# Patient Record
Sex: FEMALE | Race: WHITE | Employment: OTHER | URBAN - METROPOLITAN AREA
[De-identification: names, ages, dates, MRNs, and addresses within clinical notes are randomized per-mention and may not be internally consistent; named-entity substitution may affect disease eponyms.]

---

## 2017-05-09 ENCOUNTER — OFFICE VISIT (OUTPATIENT)
Dept: ORTHOPEDIC SURGERY | Age: 72
End: 2017-05-09

## 2017-05-09 VITALS
SYSTOLIC BLOOD PRESSURE: 111 MMHG | DIASTOLIC BLOOD PRESSURE: 66 MMHG | HEIGHT: 62 IN | WEIGHT: 91 LBS | BODY MASS INDEX: 16.75 KG/M2 | HEART RATE: 85 BPM

## 2017-05-09 DIAGNOSIS — M25.512 LEFT SHOULDER PAIN, UNSPECIFIED CHRONICITY: ICD-10-CM

## 2017-05-09 DIAGNOSIS — M75.82 ROTATOR CUFF TENDONITIS, LEFT: Primary | ICD-10-CM

## 2017-05-09 DIAGNOSIS — M75.22 BICEPS TENDONITIS ON LEFT: ICD-10-CM

## 2017-05-09 PROCEDURE — 99203 OFFICE O/P NEW LOW 30 MIN: CPT | Performed by: ORTHOPAEDIC SURGERY

## 2017-05-09 PROCEDURE — 73030 X-RAY EXAM OF SHOULDER: CPT | Performed by: ORTHOPAEDIC SURGERY

## 2017-05-09 RX ORDER — ATORVASTATIN CALCIUM 10 MG/1
TABLET, FILM COATED ORAL
COMMUNITY
Start: 2017-04-24 | End: 2022-01-20 | Stop reason: ALTCHOICE

## 2017-05-09 RX ORDER — ALENDRONATE SODIUM 70 MG/1
70 TABLET ORAL
COMMUNITY
End: 2022-01-20 | Stop reason: ALTCHOICE

## 2017-05-10 ENCOUNTER — HOSPITAL ENCOUNTER (OUTPATIENT)
Dept: PHYSICAL THERAPY | Age: 72
Discharge: HOME | End: 2017-05-31
Admitting: ORTHOPAEDIC SURGERY

## 2017-05-17 ENCOUNTER — HOSPITAL ENCOUNTER (OUTPATIENT)
Dept: PHYSICAL THERAPY | Age: 72
Discharge: HOME | End: 2017-05-17
Admitting: ORTHOPAEDIC SURGERY

## 2017-05-22 ENCOUNTER — HOSPITAL ENCOUNTER (OUTPATIENT)
Dept: PHYSICAL THERAPY | Age: 72
Discharge: HOME | End: 2017-05-22
Admitting: ORTHOPAEDIC SURGERY

## 2017-06-02 ENCOUNTER — HOSPITAL ENCOUNTER (OUTPATIENT)
Dept: PHYSICAL THERAPY | Age: 72
Discharge: HOME | End: 2017-06-02
Admitting: ORTHOPAEDIC SURGERY

## 2017-06-07 ENCOUNTER — HOSPITAL ENCOUNTER (OUTPATIENT)
Dept: PHYSICAL THERAPY | Age: 72
Discharge: HOME | End: 2017-06-07
Admitting: ORTHOPAEDIC SURGERY

## 2021-09-22 ENCOUNTER — OFFICE VISIT (OUTPATIENT)
Dept: ORTHOPEDIC SURGERY | Age: 76
End: 2021-09-22
Payer: COMMERCIAL

## 2021-09-22 VITALS — WEIGHT: 110 LBS | BODY MASS INDEX: 20.24 KG/M2 | HEIGHT: 62 IN

## 2021-09-22 DIAGNOSIS — M75.21 BICEPS TENDINITIS OF RIGHT UPPER EXTREMITY: ICD-10-CM

## 2021-09-22 DIAGNOSIS — M25.511 RIGHT SHOULDER PAIN, UNSPECIFIED CHRONICITY: Primary | ICD-10-CM

## 2021-09-22 DIAGNOSIS — M75.81 TENDINITIS OF RIGHT ROTATOR CUFF: ICD-10-CM

## 2021-09-22 DIAGNOSIS — M54.2 NECK PAIN: ICD-10-CM

## 2021-09-22 PROCEDURE — 99214 OFFICE O/P EST MOD 30 MIN: CPT | Performed by: ORTHOPAEDIC SURGERY

## 2021-09-22 NOTE — PROGRESS NOTES
Date:  2021    Name:  Vero Jeronimo  Address:  [address] [address]    :  1945      Age:   67 y.o.    SSN:  [id number]      Medical Record Number:  [id number]    Reason for Visit:    Chief Complaint    Shoulder Pain (old patient / new problem right shoulder. )      DOS:2021     HPI: Vero Jeronimo is a 67 y.o. female here today for evaluation of her right shoulder pain. Her pain has been going on for 1 year. It is getting worse over time. The pain is worse with ambulation when she feels that pain from her shoulder radiating down to her arm. She is having numbness on the dorsal side of her arm and forearm when the pain is a severe. She cannot sleep on her right shoulder and the pain wakes her up at night. She has been taking Alieve but it is not helping her pain. She complains of weakness in her right arm due to the shoulder pain. Pain Assessment  Location of Pain: Shoulder  Location Modifiers: Right  Severity of Pain: 6  Quality of Pain: Sharp, Dull, Aching  Duration of Pain: Persistent  Frequency of Pain: Constant  Aggravating Factors:  (any movement)  Limiting Behavior: Yes  Relieving Factors: Rest  Result of Injury: No  Work-Related Injury: No  Are there other pain locations you wish to document?: No  ROS: All systems reviewed on patient intake form. Pertinent items are noted in HPI. Past Medical History:   Diagnosis Date    Arthritis     Cataract     Hyperlipidemia         Past Surgical History:   Procedure Laterality Date    CATARACT REMOVAL WITH IMPLANT         History reviewed. No pertinent family history.     Social History     Socioeconomic History    Marital status:      Spouse name: None    Number of children: None    Years of education: None    Highest education level: None   Occupational History    None   Tobacco Use    Smoking status: Never Smoker    Smokeless tobacco: Never Used   Substance and Sexual Activity    Alcohol use: No    Drug use: No    Sexual activity: None   Other Topics Concern    None   Social History Narrative    None     Social Determinants of Health     Financial Resource Strain:     Difficulty of Paying Living Expenses:    Food Insecurity:     Worried About Running Out of Food in the Last Year:     [address] in the Last Year:    Transportation Needs:     Lack of Transportation (Medical):  Lack of Transportation (Non-Medical):    Physical Activity:     Days of Exercise per Week:     Minutes of Exercise per Session:    Stress:     Feeling of Stress :    Social Connections:     Frequency of Communication with Friends and Family:     Frequency of Social Gatherings with Friends and Family:     Attends Buddhist Services:     Active Member of Clubs or Organizations:     Attends Club or Organization Meetings:     Marital Status:    Intimate Partner Violence:     Fear of Current or Ex-Partner:     Emotionally Abused:     Physically Abused:     Sexually Abused:        Current Outpatient Medications   Medication Sig Dispense Refill    alendronate (FOSAMAX) 70 MG tablet Take 70 mg by mouth      atorvastatin (LIPITOR) 10 MG tablet TAKE 1 TABLET (10 MG TOTAL) BY MOUTH DAILY.  atorvastatin (LIPITOR) 10 MG tablet Take 10 mg by mouth daily       No current facility-administered medications for this visit. Allergies   Allergen Reactions    Acetaminophen-Codeine      TYLENOL #3    Demerol     Meperidine Nausea And Vomiting    Penicillins Rash       Vital signs:  Ht 5' 1.5\" (1.562 m)   Wt 110 lb (49.9 kg)   BMI 20.45 kg/m²          Right shoulder exam    Inspection:  Held in a normal posture. Normal contour at the acromioclavicular joint. No swelling, ecchymosis, or erythema about the shoulder. Moderate atrophy appreciated. No scapular winging. Palpation:  No subacromial crepitus. No tenderness of the AC joint. greater tuberosity tenderness.   Tenderness on the insertion of the rotator cuff    Range of Motion: Full passive ROM. Normal scapulothoracic rhythm. Limited active range of motion in abduction and forward flexion and internal rotation. Forward flexion to 90 degrees, abduction to 45 degrees, limited internal rotation    Strength: Weak supraspinatus,  Normal infraspinatus, and subscapularis muscle strength. Stability: No anterior instability. No posterior instability. Special Tests: Impingement findings are negative. Labral findings are negative. Speed sign and Yergason signs are both negative. Crossover sign is negative. Belly press sign is negative. Lift off sign is negative. Other findings: The skin is warm dry and well perfused. 2+ radial pulse. Sensation is intact to light touch over the deltoid. C spine: No pain or numbness with range of motion. Negative Tinel's sign on the carpal tunnel      Left comparison shoulder exam    Inspection:  Held in a normal posture. Normal contour at the acromioclavicular joint. No swelling, ecchymosis, or erythema about the shoulder. No atrophy appreciated. No scapular winging. Palpation:  No subacromial crepitus. No tenderness of the AC joint. No greater tuberosity tenderness. No tenderness in the bicipital groove. Range of Motion: Full passive and active ROM. Normal scapulothoracic rhythm. Strength:  Normal supraspinatus, infraspinatus, and subscapularis muscle strength. Stability: No anterior instability. No posterior instability. Special Tests: Impingement findings are negative. Labral findings are negative. Speed sign and Yergason signs are both negative. Crossover sign is negative. Belly press sign is negative. Lift off sign is negative. Other findings: The skin is warm dry and well perfused. 2+ radial pulse. Sensation is intact to light touch over the deltoid.       Diagnostics:  Radiology:       AP lateral axillary views of the right shoulder AP lateral of the cervical spine were obtained in the office today and independently interpreted    Impression:  No acute fracture or dislocation. Diffuse arthritic changes in the cervical spine    Assessment: 79-year-old female patient with right shoulder rotator cuff tendinitis, biceps tendinitis arthritic changes of the cervical spine    Plan: Pertinent imaging was reviewed. The etiology, natural history, and treatment options for the disorder were discussed. The roles of activity medication, antiinflammatories, injections, bracing, physical therapy, and surgical interventions were all described to the patient and questions were answered. The diagnosis of rotator cuff tendinitis/tear and biceps tendinitis was discussed with the patient. She would like to proceed with physical therapy and Voltaren gel. We will see her back in 4 weeks for another clinical assessment. Regarding her neck we will refer her to the cervical spine specialist for formal C-spine assessment. Jose Connor is in agreement with this plan. All questions were answered to patient's satisfaction and was encouraged to call with any further questions. The patient was advised that NSAID-type medications have several potential side effects that include: gastrointestinal irritation including hemorrhage, renal injury, as well as an increased risk for heart attack and stroke. The patient was asked to take the medication with food and to stop if there is any symptoms of GI upset, including heartburn, nausea, increased gas or diarrhea. I asked the patient to contact their medical provider for vomiting, abdominal pain or black/bloody stools. The patient should have renal function testing per his medical provider periodically if the medication is taken on a regular basis. The patient should be alert for any swelling in the lower extremities and should stop taking the medication immediately and contact their medical provider should this occur.   In addition, the patient should stop taking the medication immediately and contact their medical provider should there be any shortness of breath, fatigue and be evaluated in an emergency facility for any chest pain. The patient expresses understanding of these issues and questions were answered. Total time spent for evaluation, education, and development of treatment plan: 47 minutes. Corene Halsted, MD  [facility] Clinical fellow  9/22/2021    Orders Placed This Encounter   Procedures    XR SHOULDER RIGHT (MIN 2 VIEWS)     Standing Status:   Future     Number of Occurrences:   1     Standing Expiration Date:   9/21/2022     Order Specific Question:   Reason for exam:     Answer:   shoulder pain    XR CERVICAL SPINE (2-3 VIEWS)     Standing Status:   Future     Number of Occurrences:   1     Standing Expiration Date:   9/22/2022     Order Specific Question:   Reason for exam:     Answer:   neck pain       I attest that I met personally with the patient, performed the described exam, reviewed the radiographic studies and medical records associated with this patient and supervised the services that are described above.      Solange oMody MD

## 2021-09-22 NOTE — LETTER
PRESCRIPTION FOR PHYSICAL THERAPY    Patient Name: Aubrey Wright MRN: [id number]  DOS: 9/22/2021   Diagnosis:   1. Right shoulder pain, unspecified chronicity    2. Neck pain    3. Tendinitis of right rotator cuff    4. Biceps tendinitis of right upper extremity                           Surgical Procedure:          Surgical Date:   Goal:  []Decrease Pain and/or Swelling [x]Increase ROM and/or Flexibility     [x]Increase Function                           [x]Increase Strength and/or Endurance   []Other   Evaluation:  [x]Evaluation and Treatment []KT-1000   []Isokinetic Exam   []Preoperative Eval    Recommended Modalities:  [x]Modalities of Choice      []HCVS            []Electrical Stimulation     [] Remove Dressing  []Ultrasound        []TENS/TNS    [] Lumbar Traction           [] Cervical Traction   []Phonophoresis         []Hot Pack/Cold Pack   []PT Treatment, Unlisted []Other:  Therapeutic Exercises:    [x]Isometrics    [x]Range of Motion []Progressive Exer. []Balance Coordination   []Flexibility  []ROM Limited  []Total Hip Replacement   []Passive  []ROM Full   []Total Knee Replacement  []Active Assisted    []Shoulder Impingement Prog  []Active   []Tennis Elbow Program   []Capsular Shift Regular        []Isokinetics      []Spine Program   [x]Straight Leg Raises  [] Gait    []Fixation                   [] Supine                                              [] Running   [] Extension   [] Prone   [] Throwing   [] Stabilization   [] AB    [] [place] Everrett Allen   [] AD      [] Spine Eval   [] Cervical Eval  [] Conditioning   [] Lumbar  [] Stationary Bike   [] [place] Track   [] Lumbar Exer.  [] Stairmaster         [] Treadmill   [] Functional Cap [] Aquatic Prog.      [] Return to work    Treatment Program:  Frequency: [] 1x  [x] 2x  [] 3x  [] 4x  [] 5x week/month  Duration: [] 1  [] 2  [] 3  [x] 4  [] 5 week/month  Weight Bearing: [] Non  [] 1/4  [] 1/2  [] 3/4  [] Full  ROM: [] Restricted  [] Full  [] Follow established:        [] Other:

## 2021-09-28 ENCOUNTER — HOSPITAL ENCOUNTER (OUTPATIENT)
Dept: PHYSICAL THERAPY | Age: 76
Setting detail: THERAPIES SERIES
Discharge: HOME | End: 2021-09-28
Payer: COMMERCIAL

## 2021-09-28 PROCEDURE — 97530 THERAPEUTIC ACTIVITIES: CPT

## 2021-09-28 PROCEDURE — 97161 PT EVAL LOW COMPLEX 20 MIN: CPT

## 2021-09-28 NOTE — PLAN OF CARE
[place], [facility], [address]  Phone: [phone]   Fax:     [phone]                                                       Physical Therapy Certification    Dear Referring Practitioner: Mathew Gil MD,    We had the pleasure of evaluating the following patient for physical therapy services at [address] [place] [address]. A summary of our findings can be found in the initial assessment below. This includes our plan of care. If you have any questions or concerns regarding these findings, please do not hesitate to contact me at the office phone number checked above. Thank you for the referral.       Physician Signature:_______________________________Date:__________________  By signing above (or electronic signature), therapists plan is approved by physician      Patient: Florina Mishra   : 1945   MRN: [id number]  Referring Physician: Referring Practitioner: Mathew Gil MD      Evaluation Date: 2021      Medical Diagnosis Information:  Diagnosis: B49.268 (ICD-10-CM) - Right shoulder pain, unspecified chronicity; M54.2 (ICD-10-CM) - Neck pain; M75.81 (ICD-10-CM) - Tendinitis of right rotator cuff; M75.21 (ICD-10-CM) - Biceps tendinitis of right upper extremity   Treatment Diagnosis: Decreased B shoulder ROM and strength, Postural deficits w/decreased cervical spine AROM & instability                                         Insurance information: PT Insurance Information: [employer]. No copay, no prior auth    Precautions/ Contra-indications: allergies to medications  Latex Allergy:  [x]NO      []YES  Preferred Language for Healthcare:   [x]English       []Other:    C-SSRS Triggered by Intake questionnaire (Past 2 wk assessment ):   [x] No, Questionnaire did not trigger screening.   [] Yes, Patient intake triggered C-SSRS Screening     [] Completed, no further action required.    [] Completed, PCP notified via Epic    SUBJECTIVE: Patient reports pain in R shoulder extending down posterior arm into posterior hand, has numbness there as well. Same symptoms recent developed in LUE. States that RUE symptoms developed about 1 year ago of insidious onset, enjoys walking and had to put RUE in flexion synergy position to reduce symptoms while walking, could make it about 3 miles. Has had previous RTC tendon issues and thought these symptoms were more related to that, however after MD apt and referral to ortho spine doctor, feels symptoms are coming from her neck instead. Does have some neck pain at cervicothoracic junction but denies muscle pain. Does report headaches recently, typically doesn't have headaches but have developed in the morning, headache goes away after she is up and moving around. Sleeps through the night supine w/hands placed under her thighs to alleviate symptoms. Reaching overhead to put dishes away increases symptoms. States pain didn't necessarily stop her from doing things it was the fatigue that pain caused her that stopped her from doing things. Fear avoidance: I should not do physical activities that (might) make my pain worse   [] True   [x] False     Relevant Medical History: OA  Functional Outcome: NDI: raw score = 17; dysfunction = 34%    RADHA: raw score = 14/45; dysfunction = 31%    Pain Scale: 6-8/10  Easing factors: sitting, supine, hot water bath, icing  Provocative factors: lifting, reaching OH, dressing  Type: []Constant   [x]Intermittent  []Radiating []Localized []other:   Numbness/Tingling: posterior hands bilaterally    Occupation/School: , recent retired due to pain & fatigue    Living Status/Prior Level of Function: Prior to this injury / incident, pt was independent with ADLs and IADLs, amb several miles without UE symptoms.     OBJECTIVE:   Palpation: increased muscle tension noted throughout cervical spine and periscapular region. Tenderness noted at R scalenes, B SCM, T1 & C7 spinous process   Significant misalignment noted at C6/C7/T1 spinal segments    Functional Mobility/Transfers: IND    Posture: rigid trunk w/decreased lumbar lordosis;  B elevated shoulders, protracted scapulae    Gait: (include devices/WB status):  WNL     Dermatomes Normal Abnormal Comments   Top of head (C1)      Posterior occipital region (C2)      Side of neck (C3)      Top of shoulder (C4)      Lateral deltoid (C5)      Tip of thumb (C6) x     Distal middle finger (C7) x     Distal fifth finger (C8) x     Medial forearm (T1)  x    Lower extremity          Reflexes Normal Abnormal Comments   C5-6 Biceps      C5-6 Brachioradialis      C7-8 Triceps      Weisss      S1-2 Seated achilles      S1-2 Prone knee bend      L3-4 Patellar tendon      Clonus      Babinski          CERV ROM     Cervical Flexion 45 deg    Cervical Extension 40 deg    Cervical SB 10 deg* 10 deg*   Cervical rotation 45 deg 40 deg   *more rotation than SB     ROM Left Right   Shoulder Flex 134 deg** 135 deg   Shoulder Abd 136 deg 104 deg   Shoulder ER T2 T3   Shoulder IR T9 T11        **pn developed in early ROM     Strength / Myotomes Left Right   Cervical Flexion (C1-2)     Cervical Side-bending (C3)     Shoulder Shrug (C4)     Shoulder Flex 4- NT due to incr pain   Shoulder Scap 3+    Shoulder Abduction (C5)     Shoulder ER 3+    Shoulder IR 4-    Biceps (C6)     Triceps (C7)     Wrist Extension (C6)     Wrist Flexion (C7)           Thumb Abduction (C8)     Finger Abduction (T1)       Lower extremity myotomes:   [x]Normal     []Abnormal     Joint mobility: R GH joint, cervicothoracic spinal junction   []Normal    [x]Hypo   []Hyper    Orthopaedic Special Tests   Positive  Negative  NT Comments    Hautard's        Rhomberg       Sharps-Linda       Cervical Torsion / Body Rotation        C2 Kick       Modified Shear       Compression x   w/extension   Distraction [x] Patient history, allergies, meds reviewed. Medical chart reviewed. See intake form. Review Of Systems (ROS):  [x]Performed Review of systems (Integumentary, CardioPulmonary, Neurological) by intake and observation. Intake form has been scanned into medical record. Patient has been instructed to contact their primary care physician regarding ROS issues if not already being addressed at this time.       Co-morbidities/Complexities (which will affect course of rehabilitation):    []None        []Hx of COVID   Arthritic conditions   []Rheumatoid arthritis (M05.9)  [x]Osteoarthritis (M19.91)  []Gout   Cardiovascular conditions   []Hypertension (I10)  [x]Hyperlipidemia (E78.5)  []Angina pectoris (I20)  []Atherosclerosis (I70)  []Pacemaker  []Hx of CABG/stent/  cardiac surgeries   Musculoskeletal conditions   []Disc pathology   []Congenital spine pathologies   []Osteoporosis (M81.8)  []Osteopenia (M85.8)  []Scoliosis       Endocrine conditions   []Hypothyroid (E03.9)  []Hyperthyroid Gastrointestinal conditions   []Constipation (R67.49)   Metabolic conditions   []Morbid obesity (E66.01)  []Diabetes type 1(E10.65) or 2 (E11.65)   []Neuropathy (G60.9)     Cardio/Pulmonary conditions   []Asthma (J45)  []Coughing   []COPD (J44.9)  []CHF  []A-fib   Psychological Disorders  []Anxiety (F41.9)  []Depression (F32.9)   []Other:   Developmental Disorders  []Autism (F84.0)  []CP (G80)  []Down Syndrome (Q90.9)  []Developmental delay     Neurological conditions  []Prior Stroke (I69.30)  []Parkinson's (G20)  []Encephalopathy (G93.40)  []MS (G35)  []Post-polio (G14)  []SCI  []TBI  []ALS Other conditions  []Fibromyalgia (M79.7)  []Vertigo  []Syncope  []Kidney Failure  []Cancer      []currently undergoing                treatment  []Pregnancy  []Incontinence   Prior surgeries  []involved limb  []previous spinal surgery  [] section birth  []hysterectomy  []bowel / bladder surgery  []other relevant surgeries []Other:              Barriers to/and or personal factors that will affect rehab potential:              [x]Age  [x]Sex   []Smoker              [x]Motivation/Lack of Motivation                        []Co-Morbidities              []Cognitive Function, education/learning barriers              []Environmental, home barriers              []profession/work barriers  [x]past PT/medical experience  []other:    Falls Risk Assessment (30 days):    [x] Falls Risk assessed and no intervention required. [] Falls Risk assessed and Patient requires intervention due to being higher risk   TUG score (>12s at risk):     [] Falls education provided, including         ASSESSMENT:   Patient is a 66 yo female who complains of BUE pain from shoulder to posterior hands, numbness at times in post hands, and pain at base of cervical spine with headaches. Patient has follow-up w/ortho spine doctor in a couple weeks for additional assessment of cervical spine due to nature of symptoms. Patient presents with R shoulder impingement due R shoulder complex weakness and impaired posture along with radiating BUE symptoms in radial nerve/T1 nerve root distribution. Radial nerve ULNTT did reproduce symptoms in post R hand. Patient's demo's decreased strength of DCF and decreased ROM of cervical spine and B GH joints. Pt can benefit from PT services to address these deficits.       Functional Impairments:     [x]Noted cervical/thoracic/GHJ joint hypomobility   []Noted cervical/thoracic/GHJ joint hypermobility   [x]Decreased cervical/UE functional ROM   [x]Noted Headache pain aggravated by neck movements with/without dizziness   []Abnormal reflexes/sensation/myotomal/dermatomal deficits   [x]Decreased DCF control or ability to hold head up   [x]Decreased RC/scapular/core strength and neuromuscular control    [x]Decreased UE functional strength   []other:      Functional Activity Limitations (from functional questionnaire and intake)   []Reduced ability to tolerate prolonged functional positions   [x]Reduced ability or difficulty with changes of positions or transfers between positions   [x]Reduced ability to maintain good posture and demonstrate good body mechanics with sitting, bending, and lifting   [] Reduced ability or tolerance with driving and/or computer work   [x]Reduced ability to perform lifting, reaching, carrying tasks   []Reduced ability to concentrate   [x]Reduced ability to sleep    []Reduced ability to tolerate any impact through UE or spine   [x]Reduced ability to ambulate prolonged functional periods/distances   []other:    Participation Restrictions   [x]Reduced participation in self care activities   [x]Reduced participation in home management activities   []Reduced participation in work activities   []Reduced participation in social activities. [x]Reduced participation in sport/recreational activities. Classification/Subgrouping:   [x]signs/symptoms consistent with neck pain with mobility deficits     []signs/symptoms consistent with neck pain with movement coordinated impairments    [x]signs/symptoms consistent with neck pain with radiating pain    []signs/symptoms consistent with neck pain with headaches (cervicogenic)    [x]Signs/symptoms consistent with nerve root involvement including myotome & dermatome dysfunction   []sign/symptoms consistent with facet dysfunction of cervical and thoracic spine    []signs/symptoms consistent suggesting central cord compression/UMN syndromes   []signs/symptoms consistent with discogenic cervical pain   []signs/symptoms consistent with rib dysfunction   [x]signs/symptoms consistent with postural dysfunction   [x]signs/symptoms consistent with shoulder pathology    []signs/symptoms consistent with post-surgical status including decreased ROM, strength and function.    []signs/symptoms consistent with pathology which may benefit from Dry Needling   []signs/symptoms which may limit the use of advanced manual therapy techniques: (Hypertension, recent trauma, intolerance to end range positions, prior TIA, visual issues, UE myotomes loss )     Prognosis/Rehab Potential:      []Excellent   [x]Good    []Fair   []Poor    Tolerance of evaluation/treatment:    []Excellent   []Good    [x]Fair   []Poor    Physical Therapy Evaluation Complexity Justification  [x] A history of present problem with:  [] no personal factors and/or comorbidities that impact the plan of care;  [x]1-2 personal factors and/or comorbidities that impact the plan of care  []3 personal factors and/or comorbidities that impact the plan of care  [x] An examination of body systems using standardized tests and measures addressing any of the following: body structures and functions (impairments), activity limitations, and/or participation restrictions;:  [x] a total of 1-2 or more elements   [] a total of 3 or more elements   [] a total of 4 or more elements   [x] A clinical presentation with:  [x] stable and/or uncomplicated characteristics   [] evolving clinical presentation with changing characteristics  [] unstable and unpredictable characteristics;   [x] Clinical decision making of [x] low, [] moderate, [] high complexity using standardized patient assessment instrument and/or measurable assessment of functional outcome. [x] EVAL (LOW) 39896 (typically 20 minutes face-to-face)  [] EVAL (MOD) 95539 (typically 30 minutes face-to-face)  [] EVAL (HIGH) 62537 (typically 45 minutes face-to-face)  [] RE-EVAL     PLAN:   Frequency/Duration:  2 days per week for 8 Weeks:  Interventions:  [x]  Therapeutic exercise including: strength training, ROM, for cervical spine,scapula, core and Upper extremity, including postural re-education. [x]  NMR activation and proprioception for Deep cervical flexors, periscapular and RC muscles and Core, including postural re-education.     [x]  Manual therapy as indicated for C/T spine, ribs, Soft tissue to include: Dry Needling/IASTM, STM, PROM, Gr I-IV mobilizations, manipulation. [x] Modalities as needed that may include: thermal agents, E-stim, Biofeedback, US, iontophoresis as indicated  [x] Patient education on joint protection, postural re-education, activity modification, progression of HEP. HEP instruction: Written HEP instructions provided and reviewed. Access Code: [email or web address]  URL: [employer]/  Date: 09/28/2021  Prepared by: Roxanne Larose    Exercises  Gentle Levator Scapulae Stretch - 3 x daily - 7 x weekly - 3 sets - 20-30 secs hold  Supine Chin Tuck - 2 x daily - 7 x weekly - 10 reps  Seated Scapular Retraction - 2 x daily - 7 x weekly - 10 reps  Supine Shoulder Flexion Extension AAROM with Dowel - 2 x daily - 7 x weekly - 10 reps      GOALS:  Patient stated goal: improve lifting, turning over in bed, reaching  [] Progressing: [] Met: [] Not Met: [] Adjusted    Therapist goals for Patient:   Short Term Goals: To be achieved in: 2 weeks  1. Independent in HEP and progression per patient tolerance, in order to prevent re-injury. [] Progressing: [] Met: [] Not Met: [] Adjusted  2. Patient will have a decrease in pain to facilitate improvement in movement, function, and ADLs as indicated by Functional Deficits. [] Progressing: [] Met: [] Not Met: [] Adjusted    Long Term Goals: To be achieved in: 8 weeks  1. Disability index score of 15% or less for the NDI to assist with reaching prior level of function. [] Progressing: [] Met: [] Not Met: [] Adjusted  2. Patient will demonstrate increased cervical AROM to [facility], pain-free, in order to turn over in bed without waking. [] Progressing: [] Met: [] Not Met: [] Adjusted  3. Patient will demonstrate an increase in postural awareness and control and activation of deep cervical stabilizers to improve walking tolerance to PLOF levels. [] Progressing: [] Met: [] Not Met: [] Adjusted  4.  Patient will improve B shoulder flex & abd AROM to 150 deg in order to improve upper body dressing. [] Progressing: [] Met: [] Not Met: [] Adjusted  5. Patient will improve BUE strength to 4/5 throughout in order to lift objects pain-free.   [] Progressing: [] Met: [] Not Met: [] Adjusted     Electronically signed by:  Doug Guerrero, PT

## 2021-09-28 NOTE — FLOWSHEET NOTE
[address] [facility]  Phone: [phone]   Fax: [phone]    Physical Therapy Treatment Note/ Progress Report:     Date:  2021    Patient Name:  Doris Lopez    :  1945  MRN: [id number]  Restrictions/Precautions:    Medical/Treatment Diagnosis Information:  Diagnosis: M25.511 (ICD-10-CM) - Right shoulder pain, unspecified chronicity; M54.2 (ICD-10-CM) - Neck pain; M75.81 (ICD-10-CM) - Tendinitis of right rotator cuff; M75.21 (ICD-10-CM) - Biceps tendinitis of right upper extremity  Treatment Diagnosis: Decreased B shoulder ROM and strength, Postural deficits w/decreased cervical spine AROM & instability  Insurance/Certification information:  PT Insurance Information: [facility]. No copay, no prior auth  Physician Information:  Referring Practitioner: Lit Campos MD  Plan of care signed (Y/N): []  Yes [x]  No     Date of Patient follow up with Physician:      Progress Report: []  Yes  [x]  No     Date Range for reporting period:  Beginnin21  Ending:     Progress report due (10 Rx/or 30 days whichever is less): visit #10 or  (date)     Recertification due (POC duration/ or 90 days whichever is less): visit #16 or 21 (date)     Visit # Insurance Allowable Auth required?  Date Range    mn []  Yes  [x]  No pcy       Latex Allergy:  [x]NO      []YES  Preferred Language for Healthcare:   [x]English       []other:    Functional Scale:       Date assessed:  NDI: raw score = 17; dysfunction = 34%       Pain level:  6-8/10     SUBJECTIVE:  See eval    OBJECTIVE: See eval   Observation:    Test measurements:      RESTRICTIONS/PRECAUTIONS:     Exercises/Interventions:   Therapeutic Exercise (38401) Resistance / level Sets/sec Reps Notes   UBE       Pulley's       Cerv & scap stabs at wall       Resistance bands                     Mat table  Wand ex's  Serratus punch                     Therapeutic Activities (71951)                                                 NMR re-education (16175)                                          Manual Intervention (93386)       Cerv mobs/manip       Thoracic mobs/manip       CT manip       Rib mobilizations       STM                  Modalities:     Patient education:  9/28:  -pt educated on diagnosis, prognosis and expectations for rehab  -all pt questions were answered    Home Exercise Program:  Access Code: Dandy Es: [email or web address]  Date: 09/28/2021  Prepared by: Tobin Coupe     Exercises  Gentle Levator Scapulae Stretch - 3 x daily - 7 x weekly - 3 sets - 20-30 secs hold  Supine Chin Tuck - 2 x daily - 7 x weekly - 10 reps  Seated Scapular Retraction - 2 x daily - 7 x weekly - 10 reps  Supine Shoulder Flexion Extension AAROM with Dowel - 2 x daily - 7 x weekly - 10 reps    Therapeutic Exercise and NMR EXR  [x] (71605) Provided verbal/tactile cueing for activities related to strengthening, flexibility, endurance, ROM  for improvements in cervical, postural, scapular, scapulothoracic and UE control with self care, reaching, carrying, lifting, house/yardwork, driving/computer work.    [] (78196) Provided verbal/tactile cueing for activities related to improving balance, coordination, kinesthetic sense, posture, motor skill, proprioception  to assist with cervical, scapular, scapulothoracic and UE control with self care, reaching, carrying, lifting, house/yardwork, driving/computer work.  [] (91824) Therapist is in constant attendance of 2 or more patients providing skilled therapy interventions, but not providing any significant amount of measurable one-on-one time to either patient, for improvements in cervical, scapular, scapulothoracic and UE control with self care, reaching, carrying, lifting, house/yardwork, driving, computer work.      Therapeutic Activities:    [x] (08866 or 47463) Provided verbal/tactile cueing for activities related to improving Met: [] Not Met: [] Adjusted    Therapist goals for Patient:   Short Term Goals: To be achieved in: 2 weeks  1. Independent in HEP and progression per patient tolerance, in order to prevent re-injury. [] Progressing: [] Met: [] Not Met: [] Adjusted  2. Patient will have a decrease in pain to facilitate improvement in movement, function, and ADLs as indicated by Functional Deficits. [] Progressing: [] Met: [] Not Met: [] Adjusted    Long Term Goals: To be achieved in: 8 weeks  1. Disability index score of 15% or less for the NDI to assist with reaching prior level of function. [] Progressing: [] Met: [] Not Met: [] Adjusted  2. Patient will demonstrate increased cervical AROM to [facility], pain-free, in order to turn over in bed without waking. [] Progressing: [] Met: [] Not Met: [] Adjusted  3. Patient will demonstrate an increase in postural awareness and control and activation of deep cervical stabilizers to improve walking tolerance to PLOF levels. [] Progressing: [] Met: [] Not Met: [] Adjusted  4. Patient will improve B shoulder flex & abd AROM to 150 deg in order to improve upper body dressing. [] Progressing: [] Met: [] Not Met: [] Adjusted  5. Patient will improve BUE strength to 4/5 throughout in order to lift objects pain-free. [] Progressing: [] Met: [] Not Met: [] Adjusted     Overall Progression Towards Functional goals/ Treatment Progress Update:  [] Patient is progressing as expected towards functional goals listed. [] Progression is slowed due to complexities/Impairments listed. [] Progression has been slowed due to co-morbidities.   [x] Plan just implemented, too soon to assess goals progression <30days   [] Goals require adjustment due to lack of progress  [] Patient is not progressing as expected and requires additional follow up with physician  [] Other    Persisting Functional Limitations/Impairments:  []Sitting [x]Standing   [x]Walking []Squatting/bending    [x]Stairs [x]ADL's [x]Transfers [x]Reaching  [x]Housework [x]Lifting  []Driving []Job related tasks  [x]Sports/Recreation  [x]Sleeping  []Other:    ASSESSMENT:  See eval    Treatment/Activity Tolerance:  [] Patient able to complete tx  [] Patient limited by fatique  [x] Patient limited by pain   [] Patient limited by other medical complications  [x] Other: apprehension to PROM    Prognosis: [x] Good [] Fair  [] Poor    Patient Requires Follow-up: [x] Yes  [] No    PLAN: See eval. PT 2x / week for 8 weeks. -pt scheduled 4 weeks until after apt w/spinal doctor to determine appropriateness of continued PT  [] Continue per plan of care [] Alter current plan (see comments)  [x] Plan of care initiated [] Hold pending MD visit [] Discharge    Electronically signed by: Juan Rivas PT       Note: If patient does not return for scheduled/ recommended follow up visits, this note will serve as a discharge from care along with most recent update on progress.

## 2021-10-01 ENCOUNTER — HOSPITAL ENCOUNTER (OUTPATIENT)
Dept: PHYSICAL THERAPY | Age: 76
Setting detail: THERAPIES SERIES
Discharge: HOME | End: 2021-10-01
Payer: MEDICARE

## 2021-10-01 PROCEDURE — 97140 MANUAL THERAPY 1/> REGIONS: CPT

## 2021-10-01 PROCEDURE — 97112 NEUROMUSCULAR REEDUCATION: CPT

## 2021-10-01 PROCEDURE — 97110 THERAPEUTIC EXERCISES: CPT

## 2021-10-01 NOTE — FLOWSHEET NOTE
[address] [facility]  Phone: [phone]   Fax: [phone]    Physical Therapy Treatment Note/ Progress Report:     Date:  10/1/2021    Patient Name:  Mia Dumont    :  1945  MRN: [id number]  Restrictions/Precautions:    Medical/Treatment Diagnosis Information:  Diagnosis: M25.511 (ICD-10-CM) - Right shoulder pain, unspecified chronicity; M54.2 (ICD-10-CM) - Neck pain; M75.81 (ICD-10-CM) - Tendinitis of right rotator cuff; M75.21 (ICD-10-CM) - Biceps tendinitis of right upper extremity  Treatment Diagnosis: Decreased B shoulder ROM and strength, Postural deficits w/decreased cervical spine AROM & instability  Insurance/Certification information:  PT Insurance Information: [employer] No copay, no prior auth  Physician Information:  Referring Practitioner: Aliza Lepe MD  Plan of care signed (Y/N): [x]  Yes []  No     Date of Patient follow up with Physician:  Dr. Saleem Messina on 10/5     Progress Report: []  Yes  [x]  No     Date Range for reporting period:  Beginnin21  Ending:     Progress report due (10 Rx/or 30 days whichever is less): visit #10 or  (date)     Recertification due (POC duration/ or 90 days whichever is less): visit #16 or 21 (date)     Visit # Insurance Allowable Auth required? Date Range    mn []  Yes  [x]  No pcy       Latex Allergy:  [x]NO      []YES  Preferred Language for Healthcare:   [x]English       []other:    Functional Scale:       Date assessed:  NDI: raw score = 17; dysfunction = 34%       Pain level:  4-5/10     SUBJECTIVE:    Has been better since eval, exercises are getting better and can already sleeping better too. Pain is still worse in the am but once she moves around or has a warm shower, pain improves. Has been performing HEP and can already tell an improvement with those exercises as well. Denies headaches since initial eval as well.     OBJECTIVE: See eval   Observation:    Test measurements:      RESTRICTIONS/PRECAUTIONS:      Exercises/Interventions:   Therapeutic Exercise (66728) Resistance / level Sets/sec Reps Notes   UBE   -bwd / fwd  2' / 2'     Pulley's    -flex & abd   20 ea    Cerv & scap stabs at wall  -Chin tucks  -Chin tuck w/B scap retract  -Cervical rotation     15  15  10 B       10/1: radiating symptoms RUE, perform in chin tuck position next session   Resistance bands  -mid row  -B GH ext   [employer]     10  10                  Mat table  Wand flex  Wand chest press  Wand elbow flex & ext w/shld at 90 deg  Serratus punch     10  10  10  10 B                  Therapeutic Activities (88042)                                                 NMR re-education (58995)                                          Manual Intervention (53500)       Cerv P/A mobs T2, T1, C7  2 mins     Thoracic mobs/manip       CT manip       Rib mobilizations       STM B scalenes, cerv paraspinals, SOR, SCM at origin  6 mins     Scalene stretch  3x20\" B         Modalities:  10/1:  Declined MHP    Patient education:  9/28:  -pt educated on diagnosis, prognosis and expectations for rehab  -all pt questions were answered    Home Exercise Program:  Access Code: [email or web address]  URL: [email or web address]  Date: 09/28/2021  Prepared by: Lilibeth Garrison     Exercises  Gentle Levator Scapulae Stretch - 3 x daily - 7 x weekly - 3 sets - 20-30 secs hold  Supine Chin Tuck - 2 x daily - 7 x weekly - 10 reps  Seated Scapular Retraction - 2 x daily - 7 x weekly - 10 reps  Supine Shoulder Flexion Extension AAROM with Dowel - 2 x daily - 7 x weekly - 10 reps    Therapeutic Exercise and NMR EXR  [x] (85873) Provided verbal/tactile cueing for activities related to strengthening, flexibility, endurance, ROM  for improvements in cervical, postural, scapular, scapulothoracic and UE control with self care, reaching, carrying, lifting, house/yardwork, driving/computer work.    [] (75059) tissue extensibility and allowing for proper ROM for normal function with self care, reaching, carrying, lifting, house/yardwork, driving/computer work    Charges:  Timed Code Treatment Minutes: 42   Total Treatment Minutes: 42       [] EVAL - LOW (51542)   [] EVAL - MOD (20096)  [] EVAL - HIGH (62004)  [] RE-EVAL (43282)  [x] HT(80340) x  1     [] Ionto  [x] NMR (87398) x  1     [] Vaso  [x] Manual (76384) x  1     [] Ultrasound  [] TA x       [] Mech Traction (25480)  [] Aquatic Therapy x     [] ES (un) (78910):   [] Home Management Training x  [] ES(attended) (81231)   [] Dry Needling 1-2 muscles (93339):  [] Dry Needling 3+ muscles (511689  [] Group:      [] Other:     GOALS:  Patient stated goal: improve lifting, turning over in bed, reaching  [] Progressing: [] Met: [] Not Met: [] Adjusted    Therapist goals for Patient:   Short Term Goals: To be achieved in: 2 weeks  1. Independent in HEP and progression per patient tolerance, in order to prevent re-injury. [] Progressing: [] Met: [] Not Met: [] Adjusted  2. Patient will have a decrease in pain to facilitate improvement in movement, function, and ADLs as indicated by Functional Deficits. [] Progressing: [] Met: [] Not Met: [] Adjusted    Long Term Goals: To be achieved in: 8 weeks  1. Disability index score of 15% or less for the NDI to assist with reaching prior level of function. [] Progressing: [] Met: [] Not Met: [] Adjusted  2. Patient will demonstrate increased cervical AROM to [facility], pain-free, in order to turn over in bed without waking. [] Progressing: [] Met: [] Not Met: [] Adjusted  3. Patient will demonstrate an increase in postural awareness and control and activation of deep cervical stabilizers to improve walking tolerance to PLOF levels. [] Progressing: [] Met: [] Not Met: [] Adjusted  4. Patient will improve B shoulder flex & abd AROM to 150 deg in order to improve upper body dressing.   [] Progressing: [] Met: [] Not Met: [] Adjusted  5. Patient will improve BUE strength to 4/5 throughout in order to lift objects pain-free. [] Progressing: [] Met: [] Not Met: [] Adjusted     Overall Progression Towards Functional goals/ Treatment Progress Update:  [] Patient is progressing as expected towards functional goals listed. [] Progression is slowed due to complexities/Impairments listed. [] Progression has been slowed due to co-morbidities. [x] Plan just implemented, too soon to assess goals progression <30days   [] Goals require adjustment due to lack of progress  [] Patient is not progressing as expected and requires additional follow up with physician  [] Other    Persisting Functional Limitations/Impairments:  []Sitting [x]Standing   [x]Walking []Squatting/bending    [x]Stairs [x]ADL's    [x]Transfers [x]Reaching  [x]Housework [x]Lifting  []Driving []Job related tasks  [x]Sports/Recreation  [x]Sleeping  []Other:    ASSESSMENT:    Patient progress well since eval, continue progression of POC as symptoms allow. Treatment/Activity Tolerance:  [x] Patient able to complete tx  [] Patient limited by fatique  [] Patient limited by pain   [] Patient limited by other medical complications  [] Other: apprehension to PROM    Prognosis: [x] Good [] Fair  [] Poor    Patient Requires Follow-up: [x] Yes  [] No    PLAN: See eval. PT 2x / week for 8 weeks. -pt scheduled 4 weeks until after apt w/spinal doctor to determine appropriateness of continued PT  [x] Continue per plan of care [] Alter current plan (see comments)  [] Plan of care initiated [] Hold pending MD visit [] Discharge    Electronically signed by: Susanne Robbins PT       Note: If patient does not return for scheduled/ recommended follow up visits, this note will serve as a discharge from care along with most recent update on progress.

## 2021-10-06 ENCOUNTER — APPOINTMENT (OUTPATIENT)
Dept: PHYSICAL THERAPY | Age: 76
End: 2021-10-06
Payer: MEDICARE

## 2021-10-08 ENCOUNTER — HOSPITAL ENCOUNTER (OUTPATIENT)
Dept: PHYSICAL THERAPY | Age: 76
Setting detail: THERAPIES SERIES
Discharge: HOME | End: 2021-10-08
Payer: MEDICARE

## 2021-10-08 PROCEDURE — 97140 MANUAL THERAPY 1/> REGIONS: CPT

## 2021-10-08 PROCEDURE — 97110 THERAPEUTIC EXERCISES: CPT

## 2021-10-08 NOTE — FLOWSHEET NOTE
[address]  Phone: [phone]   Fax: [phone]    Physical Therapy Treatment Note/ Progress Report:     Date:  10/8/2021    Patient Name:  Nanci Self    :  1945  MRN: [id number]  Restrictions/Precautions:    Medical/Treatment Diagnosis Information:  Diagnosis: M25.511 (ICD-10-CM) - Right shoulder pain, unspecified chronicity; M54.2 (ICD-10-CM) - Neck pain; M75.81 (ICD-10-CM) - Tendinitis of right rotator cuff; M75.21 (ICD-10-CM) - Biceps tendinitis of right upper extremity  Treatment Diagnosis: Decreased B shoulder ROM and strength, Postural deficits w/decreased cervical spine AROM & instability  Insurance/Certification information:  PT Insurance Information: [employer] No copay, no prior auth  Physician Information:  Referring Practitioner: Brandon Mcleod MD  Plan of care signed (Y/N): [x]  Yes []  No     Date of Patient follow up with Physician:  Dr. Mohan Ramirez on 10/5     Progress Report: []  Yes  [x]  No     Date Range for reporting period:  Beginnin21  Ending:     Progress report due (10 Rx/or 30 days whichever is less): visit #10 or  (date)     Recertification due (POC duration/ or 90 days whichever is less): visit #16 or 21 (date)     Visit # Insurance Allowable Auth required? Date Range   3/16 mn []  Yes  [x]  No pcy       Latex Allergy:  [x]NO      []YES  Preferred Language for Healthcare:   [x]English       []other:    Functional Scale:       Date assessed:  NDI: raw score = 17; dysfunction = 34%       Pain level:  3-4/10     SUBJECTIVE:    Continues to do much better than at eval, was a little more sore following last session but could just tell she had a workout. Is having more soreness around each elbow but is no where near as bad as it was previously. Is starting to learn how to get ahead of the pain when she can feel it developing.      OBJECTIVE: See eval   Observation:    Test measurements:      RESTRICTIONS/PRECAUTIONS:      Exercises/Interventions:   Therapeutic Exercise (87873) Resistance / level Sets/sec Reps Notes   UBE   -bwd / fwd  2' / 2'     Pulley's    -flex & abd   20 ea    Cerv & scap stabs at wall  -Chin tucks  -Chin tuck w/B scap retract  -Chin tuck w/cervical rotation     15  15  15 B          Resistance bands  -mid row  -B GH ext  -[facility]   [employer]       15 B                  Mat table  Wand flex  Wand chest press  Wand elbow flex & ext w/shld at 90 deg  Wand horiz abd/add  Serratus punch     20  12    15                    Therapeutic Activities (79417)                                                 NMR re-education (05069)                                          Manual Intervention (43488)       Cerv P/A mobs T2, T1, C7  2 mins     Thoracic mobs/manip       CT manip       Rib mobilizations       STM B scalenes, cerv paraspinals, SOR, SCM at origin    STM to B upper trap, B C7 TP, B cerv paraspinals         4 mins     Scalene stretch  3x20\" B         Modalities:  10/1:  Declined MHP    Patient education:  9/28:  -pt educated on diagnosis, prognosis and expectations for rehab  -all pt questions were answered    Home Exercise Program:  Access Code: [email or web address]  URL: [email or web address]  Date: 09/28/2021  Prepared by: Alexandria Bowers     Exercises  Gentle Levator Scapulae Stretch - 3 x daily - 7 x weekly - 3 sets - 20-30 secs hold  Supine Chin Tuck - 2 x daily - 7 x weekly - 10 reps  Seated Scapular Retraction - 2 x daily - 7 x weekly - 10 reps  Supine Shoulder Flexion Extension AAROM with Dowel - 2 x daily - 7 x weekly - 10 reps    Therapeutic Exercise and NMR EXR  [x] (24716) Provided verbal/tactile cueing for activities related to strengthening, flexibility, endurance, ROM  for improvements in cervical, postural, scapular, scapulothoracic and UE control with self care, reaching, carrying, lifting, house/yardwork, driving/computer work.     [] (58687) Provided verbal/tactile cueing for activities related to improving balance, coordination, kinesthetic sense, posture, motor skill, proprioception  to assist with cervical, scapular, scapulothoracic and UE control with self care, reaching, carrying, lifting, house/yardwork, driving/computer work.  [] (42599) Therapist is in constant attendance of 2 or more patients providing skilled therapy interventions, but not providing any significant amount of measurable one-on-one time to either patient, for improvements in cervical, scapular, scapulothoracic and UE control with self care, reaching, carrying, lifting, house/yardwork, driving, computer work. Therapeutic Activities:    [x] (35488 or 30596) Provided verbal/tactile cueing for activities related to improving balance, coordination, kinesthetic sense, posture, motor skill, proprioception and motor activation to allow for proper function of cervical, scapular, scapulothoracic and UE control with self care, carrying, lifting, driving/computer work.      Home Exercise Program:    [x] (50923) Reviewed/Progressed HEP activities related to strengthening, flexibility, endurance, ROM of cervical, scapular, scapulothoracic and UE control with self care, reaching, carrying, lifting, house/yardwork, driving/computer work  [] (80303) Reviewed/Progressed HEP activities related to improving balance, coordination, kinesthetic sense, posture, motor skill, proprioception of cervical, scapular, scapulothoracic and UE control with self care, reaching, carrying, lifting, house/yardwork, driving/computer work      Manual Treatments:  PROM / STM / Oscillations-Mobs:  G-I, II, III, IV (PA's, Inf., Post.)  [x] (14600) Provided manual therapy to mobilize soft tissue/joints of cervical/CT, scapular GHJ and UE for the purpose of decreasing headache, modulating pain, promoting relaxation,  increasing ROM, reducing/eliminating soft tissue swelling/inflammation/restriction, improving soft tissue extensibility and allowing for proper ROM for normal function with self care, reaching, carrying, lifting, house/yardwork, driving/computer work    Charges:  Timed Code Treatment Minutes: 45   Total Treatment Minutes: 45       [] EVAL - LOW (49283)   [] EVAL - MOD (27804)  [] EVAL - HIGH (90368)  [] RE-EVAL (52376)  [x] QS(51531) x  2     [] Ionto  [] NMR (47329) x       [] Vaso  [x] Manual (62304) x  1     [] Ultrasound  [] TA x       [] Mech Traction (09458)  [] Aquatic Therapy x     [] ES (un) (46518):   [] Home Management Training x  [] ES(attended) (68995)   [] Dry Needling 1-2 muscles (27237):  [] Dry Needling 3+ muscles (891691  [] Group:      [] Other:     GOALS:  Patient stated goal: improve lifting, turning over in bed, reaching  [] Progressing: [] Met: [] Not Met: [] Adjusted    Therapist goals for Patient:   Short Term Goals: To be achieved in: 2 weeks  1. Independent in HEP and progression per patient tolerance, in order to prevent re-injury. [] Progressing: [] Met: [] Not Met: [] Adjusted  2. Patient will have a decrease in pain to facilitate improvement in movement, function, and ADLs as indicated by Functional Deficits. [] Progressing: [] Met: [] Not Met: [] Adjusted    Long Term Goals: To be achieved in: 8 weeks  1. Disability index score of 15% or less for the NDI to assist with reaching prior level of function. [] Progressing: [] Met: [] Not Met: [] Adjusted  2. Patient will demonstrate increased cervical AROM to [facility], pain-free, in order to turn over in bed without waking. [] Progressing: [] Met: [] Not Met: [] Adjusted  3. Patient will demonstrate an increase in postural awareness and control and activation of deep cervical stabilizers to improve walking tolerance to PLOF levels. [] Progressing: [] Met: [] Not Met: [] Adjusted  4. Patient will improve B shoulder flex & abd AROM to 150 deg in order to improve upper body dressing.   [] Progressing: [] Met: [] Not Met: [] Adjusted  5. Patient will improve BUE strength to 4/5 throughout in order to lift objects pain-free. [] Progressing: [] Met: [] Not Met: [] Adjusted     Overall Progression Towards Functional goals/ Treatment Progress Update:  [] Patient is progressing as expected towards functional goals listed. [] Progression is slowed due to complexities/Impairments listed. [] Progression has been slowed due to co-morbidities. [x] Plan just implemented, too soon to assess goals progression <30days   [] Goals require adjustment due to lack of progress  [] Patient is not progressing as expected and requires additional follow up with physician  [] Other    Persisting Functional Limitations/Impairments:  []Sitting [x]Standing   [x]Walking []Squatting/bending    [x]Stairs [x]ADL's    [x]Transfers [x]Reaching  [x]Housework [x]Lifting  []Driving []Job related tasks  [x]Sports/Recreation  [x]Sleeping  []Other:    ASSESSMENT:    Patient continues to progress well, has apt w/spinal MD next week, plans to discuss symptoms for possible alternate dx. Add to HEP next session as symptoms remain controlled. Treatment/Activity Tolerance:  [x] Patient able to complete tx  [] Patient limited by fatique  [] Patient limited by pain   [] Patient limited by other medical complications  [] Other: apprehension to PROM    Prognosis: [x] Good [] Fair  [] Poor    Patient Requires Follow-up: [x] Yes  [] No    PLAN: See eval. PT 2x / week for 8 weeks. -pt scheduled 4 weeks until after apt w/spinal doctor to determine appropriateness of continued PT  [x] Continue per plan of care [] Alter current plan (see comments)  [] Plan of care initiated [] Hold pending MD visit [] Discharge    Electronically signed by: Cal Mckoy PT       Note: If patient does not return for scheduled/ recommended follow up visits, this note will serve as a discharge from care along with most recent update on progress.

## 2021-10-12 ENCOUNTER — OFFICE VISIT (OUTPATIENT)
Dept: ORTHOPEDIC SURGERY | Age: 76
End: 2021-10-12
Payer: COMMERCIAL

## 2021-10-12 ENCOUNTER — HOSPITAL ENCOUNTER (OUTPATIENT)
Dept: PHYSICAL THERAPY | Age: 76
Setting detail: THERAPIES SERIES
Discharge: HOME | End: 2021-10-12
Payer: MEDICARE

## 2021-10-12 VITALS — WEIGHT: 110 LBS | BODY MASS INDEX: 20.77 KG/M2 | HEIGHT: 61 IN

## 2021-10-12 DIAGNOSIS — M47.12 OSTEOARTHRITIS OF CERVICAL SPINE WITH MYELOPATHY: Primary | ICD-10-CM

## 2021-10-12 DIAGNOSIS — M54.2 NECK PAIN: ICD-10-CM

## 2021-10-12 PROCEDURE — 99214 OFFICE O/P EST MOD 30 MIN: CPT | Performed by: ORTHOPAEDIC SURGERY

## 2021-10-12 PROCEDURE — 97110 THERAPEUTIC EXERCISES: CPT

## 2021-10-12 PROCEDURE — 97140 MANUAL THERAPY 1/> REGIONS: CPT

## 2021-10-12 NOTE — PROGRESS NOTES
New Patient: CERVICAL SPINE    Referring Provider:  Yaw Garrison MD    CHIEF COMPLAINT:    Chief Complaint   Patient presents with    Neck Pain     cervical       HISTORY OF PRESENT ILLNESS:   Ms. Dena Vuong is a pleasant 67 y.o. old male who presents for neck and greater than left arm pain. Her symptoms began insidiously about 10 months ago. Those have steadily increased since that time. Her pain radiates to her wrists bilaterally. She rates her neck pain 1/10 in her right greater than left arm pain 7/10. She notes numbness tingling and weakness of both upper extremities. She denies loss of fine motor control but notes difficulty with her balance. She also describes intermittent dysphagia and what may be sleep apnea when she is laying on her back. Current/Past Treatment:   · Physical Therapy: Has not helped  · Chiropractic: None  · Injection: None  · Medications: None    Past Medical History:   Past Medical History:   Diagnosis Date    Arthritis     Cataract     Hyperlipidemia       Past Surgical History:     Past Surgical History:   Procedure Laterality Date    CATARACT REMOVAL WITH IMPLANT       Current Medications:     Current Outpatient Medications:     alendronate (FOSAMAX) 70 MG tablet, Take 70 mg by mouth, Disp: , Rfl:     atorvastatin (LIPITOR) 10 MG tablet, TAKE 1 TABLET (10 MG TOTAL) BY MOUTH DAILY. , Disp: , Rfl:     atorvastatin (LIPITOR) 10 MG tablet, Take 10 mg by mouth daily, Disp: , Rfl:   Allergies:  Acetaminophen-codeine, Demerol, Meperidine, and Penicillins  Social History:    reports that she has never smoked. She has never used smokeless tobacco. She reports that she does not drink alcohol and does not use drugs. Family History:   No family history on file.     REVIEW OF SYSTEMS: Full ROS noted & scanned   CONSTITUTIONAL: Denies unexplained weight loss, fevers, chills or fatigue  NEUROLOGICAL: Denies unsteady gait or progressive weakness  MUSCULOSKELETAL: Denies joint swelling or redness  PSYCHOLOGICAL: Denies anxiety, depression   SKIN: Denies skin changes, delayed healing, rash, itching   HEMATOLOGIC: Denies easy bleeding or bruising  ENDOCRINE: Denies excessive thirst, urination, heat/cold  RESPIRATORY: Denies current dyspnea, cough  GI: Denies nausea, vomiting, diarrhea   : Denies bowel or bladder issues       PHYSICAL EXAM:    Vitals: Height 5' 1.5\" (1.562 m), weight 110 lb (49.9 kg). GENERAL EXAM:  · General Apparence: Patient is adequately groomed with no evidence of malnutrition. · Orientation: The patient is oriented to time, place and person. · Mood & Affect:The patient's mood and affect are appropriate   · Vascular: Examination reveals no swelling tenderness in upper or lower extremities. Good capillary refill  · Lymphatic: The lymphatic examination bilaterally reveals all areas to be without enlargement or induration  · Sensation: Sensation is intact without deficit  · Coordination/Balance: Good coordination     CERVICAL EXAMINATION:  · Inspection: Local inspection shows no step-off or bruising. Cervical alignment is normal.     · Palpation: No evidence of tenderness at the midline, and trapezius. Paraspinal tenderness is present. There is no step-off or paraspinal spasm. · Range of Motion: Cervical flexion, extension, and rotation are mildly reduced with pain. · Strength: 5/5 bilateral upper extremities   · Special Tests:    ·  Weiss's negative bilaterally. ·      Cubital and Carpal tunnel Tinel's negative bilaterally. · Skin:There are no rashes, ulcerations or lesions in right & left upper extremities. · Reflexes: Bilaterally triceps, biceps and brachioradialis are 2+. Clonus absent bilaterally at the feet. · Gait & station: normal, patient ambulates without assistance     · Additional Examinations:       · RIGHT UPPER EXTREMITY:  Inspection/examination of the right upper extremity does not show any tenderness, deformity or injury.  Range of motion is unremarkable. There is no gross instability. There are no rashes, ulcerations or lesions. Strength and tone are normal.  · LEFT UPPER EXTREMITY: Inspection/examination of the left upper extremity does not show any tenderness, deformity or injury. Range of motion is unremarkable. There is no gross instability. There are no rashes, ulcerations or lesions. Strength and tone are normal.    Diagnostic Testing:  I reviewed AP and lateral x-rays of her cervical spine from a previous office visit. Those show spondylosis    Impression:   Spondylosis with radiculopathy and myelopathy  Dysphagia  Sleep apnea    Plan:    We discussed treatment options including observation, physical therapy, medications, epidural injections and additional testing. Recommended an EMG of her upper extremities and an MRI of her cervical spine. She may eventually need a swallowing study and a sleep apnea study.

## 2021-10-12 NOTE — FLOWSHEET NOTE
[address]  Phone: [phone]   Fax: [phone]    Physical Therapy Treatment Note/ Progress Report:     Date:  10/12/2021    Patient Name:  Stefanie Hernandez    :  1945  MRN: [id number]  Restrictions/Precautions:    Medical/Treatment Diagnosis Information:  Diagnosis: M25.511 (ICD-10-CM) - Right shoulder pain, unspecified chronicity; M54.2 (ICD-10-CM) - Neck pain; M75.81 (ICD-10-CM) - Tendinitis of right rotator cuff; M75.21 (ICD-10-CM) - Biceps tendinitis of right upper extremity  Treatment Diagnosis: Decreased B shoulder ROM and strength, Postural deficits w/decreased cervical spine AROM & instability  Insurance/Certification information:  PT Insurance Information: [employer] No copay, no prior auth  Physician Information:  Referring Practitioner: Divine Larsen MD  Plan of care signed (Y/N): [x]  Yes []  No     Date of Patient follow up with Physician:  Dr. Diego Hadley on 10/12     Progress Report: []  Yes  [x]  No     Date Range for reporting period:  Beginnin21  Ending:     Progress report due (10 Rx/or 30 days whichever is less): visit #10 or  (date)     Recertification due (POC duration/ or 90 days whichever is less): visit #16 or 21 (date)     Visit # Insurance Allowable Auth required? Date Range    mn []  Yes  [x]  No pcy       Latex Allergy:  [x]NO      []YES  Preferred Language for Healthcare:   [x]English       []other:    Functional Scale:       Date assessed:  NDI: raw score = 17; dysfunction = 34%       Pain level:  4-5/10     SUBJECTIVE:     Had increased pain day after last session, woke her up overnight. Pain was back in R shoulder to elbow. Tried to do some of the ex's but made her pain worse. Still having some more pain today. Pt has apt today with spinal doctor.        OBJECTIVE:     Observation:    Test measurements:      RESTRICTIONS/PRECAUTIONS:      Exercises/Interventions: Therapeutic Exercise (29837) Resistance / level Sets/sec Reps Notes   UBE   -fwd / bwd  3.5' / 30\"     Pulley's    -flex & abd   20 ea    Cerv & scap stabs at wall  -Chin tucks  -Chin tuck w/B scap retract  -Chin tuck w/cervical rotation     15  15  15 B          Resistance bands  -mid row  -B GH ext  -GH ER  -no money ER   [employer]          2     15 B  15                  Mat table  Wand flex  Wand chest press  Wand elbow flex & ext w/shld at 90 deg  Wand horiz abd/add  Serratus punch     15  15    15                    Therapeutic Activities (90946)                                                 NMR re-education (00575)                                          Manual Intervention (47543)       Cerv P/A mobs T2, T1, C7       B shoulder PROM into each direction w/OP  2\" hold 4 mins    CT manip       Rib mobilizations       STM B scalenes, cerv paraspinals, SOR, SCM at origin    STM to B upper trap, B C7 TP, B cerv paraspinals         3 mins     Scalene stretch  Upper trap stretch  2x30\" R  2x30\" R         Modalities:  10/1:  Declined MHP    Patient education:  9/28:  -pt educated on diagnosis, prognosis and expectations for rehab  -all pt questions were answered    Home Exercise Program:  Access Code: [email or web address]  URL: [email or web address]  Date: 09/28/2021  Prepared by: Renell Alpers     Exercises  Gentle Levator Scapulae Stretch - 3 x daily - 7 x weekly - 3 sets - 20-30 secs hold  Supine Chin Tuck - 2 x daily - 7 x weekly - 10 reps  Seated Scapular Retraction - 2 x daily - 7 x weekly - 10 reps  Supine Shoulder Flexion Extension AAROM with Dowel - 2 x daily - 7 x weekly - 10 reps    Therapeutic Exercise and NMR EXR  [x] (03305) Provided verbal/tactile cueing for activities related to strengthening, flexibility, endurance, ROM  for improvements in cervical, postural, scapular, scapulothoracic and UE control with self care, reaching, carrying, lifting, house/yardwork, driving/computer work.    [] (66517) Provided verbal/tactile cueing for activities related to improving balance, coordination, kinesthetic sense, posture, motor skill, proprioception  to assist with cervical, scapular, scapulothoracic and UE control with self care, reaching, carrying, lifting, house/yardwork, driving/computer work.  [] (83726) Therapist is in constant attendance of 2 or more patients providing skilled therapy interventions, but not providing any significant amount of measurable one-on-one time to either patient, for improvements in cervical, scapular, scapulothoracic and UE control with self care, reaching, carrying, lifting, house/yardwork, driving, computer work. Therapeutic Activities:    [x] (16933 or 07023) Provided verbal/tactile cueing for activities related to improving balance, coordination, kinesthetic sense, posture, motor skill, proprioception and motor activation to allow for proper function of cervical, scapular, scapulothoracic and UE control with self care, carrying, lifting, driving/computer work.      Home Exercise Program:    [x] (10272) Reviewed/Progressed HEP activities related to strengthening, flexibility, endurance, ROM of cervical, scapular, scapulothoracic and UE control with self care, reaching, carrying, lifting, house/yardwork, driving/computer work  [] (54173) Reviewed/Progressed HEP activities related to improving balance, coordination, kinesthetic sense, posture, motor skill, proprioception of cervical, scapular, scapulothoracic and UE control with self care, reaching, carrying, lifting, house/yardwork, driving/computer work      Manual Treatments:  PROM / STM / Oscillations-Mobs:  G-I, II, III, IV (PA's, Inf., Post.)  [x] (86341) Provided manual therapy to mobilize soft tissue/joints of cervical/CT, scapular GHJ and UE for the purpose of decreasing headache, modulating pain, promoting relaxation,  increasing ROM, reducing/eliminating soft tissue swelling/inflammation/restriction, improving soft tissue extensibility and allowing for proper ROM for normal function with self care, reaching, carrying, lifting, house/yardwork, driving/computer work    Charges:  Timed Code Treatment Minutes: 44   Total Treatment Minutes: 44       [] EVAL - LOW (82909)   [] EVAL - MOD (31111)  [] EVAL - HIGH (08611)  [] RE-EVAL (35903)  [x] MM(37280) x  2     [] Ionto  [] NMR (85871) x       [] Vaso  [x] Manual (78046) x  1     [] Ultrasound  [] TA x       [] Mech Traction (07249)  [] Aquatic Therapy x     [] ES (un) (28661):   [] Home Management Training x  [] ES(attended) (48334)   [] Dry Needling 1-2 muscles (55380):  [] Dry Needling 3+ muscles (081857  [] Group:      [] Other:     GOALS:  Patient stated goal: improve lifting, turning over in bed, reaching  [] Progressing: [] Met: [] Not Met: [] Adjusted    Therapist goals for Patient:   Short Term Goals: To be achieved in: 2 weeks  1. Independent in HEP and progression per patient tolerance, in order to prevent re-injury. [] Progressing: [] Met: [] Not Met: [] Adjusted  2. Patient will have a decrease in pain to facilitate improvement in movement, function, and ADLs as indicated by Functional Deficits. [] Progressing: [] Met: [] Not Met: [] Adjusted    Long Term Goals: To be achieved in: 8 weeks  1. Disability index score of 15% or less for the NDI to assist with reaching prior level of function. [] Progressing: [] Met: [] Not Met: [] Adjusted  2. Patient will demonstrate increased cervical AROM to [facility], pain-free, in order to turn over in bed without waking. [] Progressing: [] Met: [] Not Met: [] Adjusted  3. Patient will demonstrate an increase in postural awareness and control and activation of deep cervical stabilizers to improve walking tolerance to PLOF levels. [] Progressing: [] Met: [] Not Met: [] Adjusted  4. Patient will improve B shoulder flex & abd AROM to 150 deg in order to improve upper body dressing.   [] Progressing: [] Met: [] Not Met: [] Adjusted  5. Patient will improve BUE strength to 4/5 throughout in order to lift objects pain-free. [] Progressing: [] Met: [] Not Met: [] Adjusted     Overall Progression Towards Functional goals/ Treatment Progress Update:  [] Patient is progressing as expected towards functional goals listed. [] Progression is slowed due to complexities/Impairments listed. [] Progression has been slowed due to co-morbidities. [x] Plan just implemented, too soon to assess goals progression <30days   [] Goals require adjustment due to lack of progress  [] Patient is not progressing as expected and requires additional follow up with physician  [] Other    Persisting Functional Limitations/Impairments:  []Sitting [x]Standing   [x]Walking []Squatting/bending    [x]Stairs [x]ADL's    [x]Transfers [x]Reaching  [x]Housework [x]Lifting  []Driving []Job related tasks  [x]Sports/Recreation  [x]Sleeping  []Other:    ASSESSMENT:     Patient had initial difficulty tolerating activity, improved well as exercises progressed and pt reported feeling better by end of session. After chin tucks, pt able to complete each activity with minimal cues for improved technique. Continue to progress cervical and scap stabs to improve posture and pain levels. Treatment/Activity Tolerance:  [x] Patient able to complete tx  [] Patient limited by fatique  [] Patient limited by pain   [] Patient limited by other medical complications  [] Other: apprehension to PROM    Prognosis: [x] Good [] Fair  [] Poor    Patient Requires Follow-up: [x] Yes  [] No    PLAN: See eval. PT 2x / week for 8 weeks.  -pt scheduled 4 weeks until after apt w/spinal doctor to determine appropriateness of continued PT  [x] Continue per plan of care [] Alter current plan (see comments)  [] Plan of care initiated [] Hold pending MD visit [] Discharge    Electronically signed by: Alexandria Bowers PT       Note: If patient does not return for scheduled/ recommended follow up visits, this note will serve as a discharge from care along with most recent update on progress.

## 2021-10-13 ENCOUNTER — TELEPHONE (OUTPATIENT)
Dept: ORTHOPEDIC SURGERY | Age: 76
End: 2021-10-13

## 2021-10-14 ENCOUNTER — HOSPITAL ENCOUNTER (OUTPATIENT)
Dept: PHYSICAL THERAPY | Age: 76
Setting detail: THERAPIES SERIES
Discharge: HOME | End: 2021-10-14
Payer: MEDICARE

## 2021-10-14 PROCEDURE — 97110 THERAPEUTIC EXERCISES: CPT

## 2021-10-14 PROCEDURE — 97140 MANUAL THERAPY 1/> REGIONS: CPT

## 2021-10-14 NOTE — FLOWSHEET NOTE
[address]  Phone: [phone]   Fax: [phone]    Physical Therapy Treatment Note/ Progress Report:     Date:  10/14/2021    Patient Name:  Kanwal Gaming    :  1945  MRN: [id number]  Restrictions/Precautions:    Medical/Treatment Diagnosis Information:  Diagnosis: M25.511 (ICD-10-CM) - Right shoulder pain, unspecified chronicity; M54.2 (ICD-10-CM) - Neck pain; M75.81 (ICD-10-CM) - Tendinitis of right rotator cuff; M75.21 (ICD-10-CM) - Biceps tendinitis of right upper extremity  Treatment Diagnosis: Decreased B shoulder ROM and strength, Postural deficits w/decreased cervical spine AROM & instability  Insurance/Certification information:  PT Insurance Information: [employer] No copay, no prior auth  Physician Information:  Referring Practitioner: Aneta Griggs MD  Plan of care signed (Y/N): [x]  Yes []  No     Date of Patient follow up with Physician:  Dr. Maryana Graff on 10/12     Progress Report: []  Yes  [x]  No     Date Range for reporting period:  Beginnin21  Ending:     Progress report due (10 Rx/or 30 days whichever is less): visit #10 or  (date)     Recertification due (POC duration/ or 90 days whichever is less): visit #16 or 21 (date)     Visit # Insurance Allowable Auth required? Date Range    mn []  Yes  [x]  No pcy       Latex Allergy:  [x]NO      []YES  Preferred Language for Healthcare:   [x]English       []other:    Functional Scale:       Date assessed:  NDI: raw score = 17; dysfunction = 34%       Pain level:  310     SUBJECTIVE:      Had more soreness in B arms last night into this am, doing better now. Pain over night was about a 7-8/10. Had MD apt this week, has an MRI scheduled next week and EMG scheduled for middle . No changes to medications at this time.          OBJECTIVE:     Observation:    Test measurements:      RESTRICTIONS/PRECAUTIONS: control with self care, reaching, carrying, lifting, house/yardwork, driving/computer work.    [] (81531) Provided verbal/tactile cueing for activities related to improving balance, coordination, kinesthetic sense, posture, motor skill, proprioception  to assist with cervical, scapular, scapulothoracic and UE control with self care, reaching, carrying, lifting, house/yardwork, driving/computer work.  [] (57421) Therapist is in constant attendance of 2 or more patients providing skilled therapy interventions, but not providing any significant amount of measurable one-on-one time to either patient, for improvements in cervical, scapular, scapulothoracic and UE control with self care, reaching, carrying, lifting, house/yardwork, driving, computer work. Therapeutic Activities:    [x] (39518 or 10059) Provided verbal/tactile cueing for activities related to improving balance, coordination, kinesthetic sense, posture, motor skill, proprioception and motor activation to allow for proper function of cervical, scapular, scapulothoracic and UE control with self care, carrying, lifting, driving/computer work.      Home Exercise Program:    [x] (01875) Reviewed/Progressed HEP activities related to strengthening, flexibility, endurance, ROM of cervical, scapular, scapulothoracic and UE control with self care, reaching, carrying, lifting, house/yardwork, driving/computer work  [] (53280) Reviewed/Progressed HEP activities related to improving balance, coordination, kinesthetic sense, posture, motor skill, proprioception of cervical, scapular, scapulothoracic and UE control with self care, reaching, carrying, lifting, house/yardwork, driving/computer work      Manual Treatments:  PROM / STM / Oscillations-Mobs:  G-I, II, III, IV (PA's, Inf., Post.)  [x] (68552) Provided manual therapy to mobilize soft tissue/joints of cervical/CT, scapular GHJ and UE for the purpose of decreasing headache, modulating pain, promoting relaxation, increasing ROM, reducing/eliminating soft tissue swelling/inflammation/restriction, improving soft tissue extensibility and allowing for proper ROM for normal function with self care, reaching, carrying, lifting, house/yardwork, driving/computer work    Charges:  Timed Code Treatment Minutes: 42   Total Treatment Minutes: 42       [] EVAL - LOW (84856)   [] EVAL - MOD (63052)  [] EVAL - HIGH (61891)  [] RE-EVAL (41382)  [x] TE(36071) x  2     [] Ionto  [] NMR (02483) x       [] Vaso  [x] Manual (03046) x  1     [] Ultrasound  [] TA x       [] Mech Traction (82525)  [] Aquatic Therapy x     [] ES (un) (10043):   [] Home Management Training x  [] ES(attended) (35314)   [] Dry Needling 1-2 muscles (34537):  [] Dry Needling 3+ muscles (195314  [] Group:      [] Other:     GOALS:  Patient stated goal: improve lifting, turning over in bed, reaching  [] Progressing: [] Met: [] Not Met: [] Adjusted    Therapist goals for Patient:   Short Term Goals: To be achieved in: 2 weeks  1. Independent in HEP and progression per patient tolerance, in order to prevent re-injury. [] Progressing: [] Met: [] Not Met: [] Adjusted  2. Patient will have a decrease in pain to facilitate improvement in movement, function, and ADLs as indicated by Functional Deficits. [] Progressing: [] Met: [] Not Met: [] Adjusted    Long Term Goals: To be achieved in: 8 weeks  1. Disability index score of 15% or less for the NDI to assist with reaching prior level of function. [] Progressing: [] Met: [] Not Met: [] Adjusted  2. Patient will demonstrate increased cervical AROM to [facility], pain-free, in order to turn over in bed without waking. [] Progressing: [] Met: [] Not Met: [] Adjusted  3. Patient will demonstrate an increase in postural awareness and control and activation of deep cervical stabilizers to improve walking tolerance to PLOF levels. [] Progressing: [] Met: [] Not Met: [] Adjusted  4.  Patient will improve B shoulder flex & abd AROM to 150 deg in order to improve upper body dressing. [] Progressing: [] Met: [] Not Met: [] Adjusted  5. Patient will improve BUE strength to 4/5 throughout in order to lift objects pain-free. [] Progressing: [] Met: [] Not Met: [] Adjusted     Overall Progression Towards Functional goals/ Treatment Progress Update:  [] Patient is progressing as expected towards functional goals listed. [] Progression is slowed due to complexities/Impairments listed. [] Progression has been slowed due to co-morbidities. [x] Plan just implemented, too soon to assess goals progression <30days   [] Goals require adjustment due to lack of progress  [] Patient is not progressing as expected and requires additional follow up with physician  [] Other    Persisting Functional Limitations/Impairments:  []Sitting [x]Standing   [x]Walking []Squatting/bending    [x]Stairs [x]ADL's    [x]Transfers [x]Reaching  [x]Housework [x]Lifting  []Driving []Job related tasks  [x]Sports/Recreation  [x]Sleeping  []Other:    ASSESSMENT:      Patient continues to tolerate ex's well, increase exercise intensity & continue cervical stabs next session. Treatment/Activity Tolerance:  [x] Patient able to complete tx  [] Patient limited by fatique  [] Patient limited by pain   [] Patient limited by other medical complications  [] Other: apprehension to PROM    Prognosis: [x] Good [] Fair  [] Poor    Patient Requires Follow-up: [x] Yes  [] No    PLAN: See eval. PT 2x / week for 8 weeks. -pt scheduled 4 weeks until after apt w/spinal doctor to determine appropriateness of continued PT  [x] Continue per plan of care [] Alter current plan (see comments)  [] Plan of care initiated [] Hold pending MD visit [] Discharge    Electronically signed by: Phebe Cockayne, PT       Note: If patient does not return for scheduled/ recommended follow up visits, this note will serve as a discharge from care along with most recent update on progress.

## 2021-10-20 ENCOUNTER — HOSPITAL ENCOUNTER (OUTPATIENT)
Dept: PHYSICAL THERAPY | Age: 76
Setting detail: THERAPIES SERIES
Discharge: HOME | End: 2021-10-20
Payer: MEDICARE

## 2021-10-20 PROCEDURE — 97140 MANUAL THERAPY 1/> REGIONS: CPT

## 2021-10-20 PROCEDURE — 97110 THERAPEUTIC EXERCISES: CPT

## 2021-10-20 NOTE — FLOWSHEET NOTE
[address]  Phone: [phone]   Fax: [phone]    Physical Therapy Treatment Note/ Progress Report:     Date:  10/20/2021    Patient Name:  Dena Vuong    :  1945  MRN: [id number]  Restrictions/Precautions:    Medical/Treatment Diagnosis Information:  Diagnosis: M25.511 (ICD-10-CM) - Right shoulder pain, unspecified chronicity; M54.2 (ICD-10-CM) - Neck pain; M75.81 (ICD-10-CM) - Tendinitis of right rotator cuff; M75.21 (ICD-10-CM) - Biceps tendinitis of right upper extremity  Treatment Diagnosis: Decreased B shoulder ROM and strength, Postural deficits w/decreased cervical spine AROM & instability  Insurance/Certification information:  PT Insurance Information: [address] No copay, no prior auth  Physician Information:  Referring Practitioner: Nicci Zamudio MD  Plan of care signed (Y/N): [x]  Yes []  No     Date of Patient follow up with Physician:  Dr. John Turner on 10/12     Progress Report: []  Yes  [x]  No     Date Range for reporting period:  Beginnin21  Ending:     Progress report due (10 Rx/or 30 days whichever is less): visit #10 or  (date)     Recertification due (POC duration/ or 90 days whichever is less): visit #16 or 21 (date)     Visit # Insurance Allowable Auth required? Date Range    mn []  Yes  [x]  No pcy       Latex Allergy:  [x]NO      []YES  Preferred Language for Healthcare:   [x]English       []other:    Functional Scale:       Date assessed:  NDI: raw score = 17; dysfunction = 34%       Pain level:  2/10     SUBJECTIVE:      Continues to feel better since beginning PT. Has been incorporating shoulder positioning and relaxation while at home and traveling.   Only having tenderness in R arm, not really pain this am.         OBJECTIVE:     Observation:    Test measurements:      RESTRICTIONS/PRECAUTIONS:      Exercises/Interventions:   Therapeutic Exercise (95424) Resistance / strengthening, flexibility, endurance, ROM  for improvements in cervical, postural, scapular, scapulothoracic and UE control with self care, reaching, carrying, lifting, house/yardwork, driving/computer work.    [] (22676) Provided verbal/tactile cueing for activities related to improving balance, coordination, kinesthetic sense, posture, motor skill, proprioception  to assist with cervical, scapular, scapulothoracic and UE control with self care, reaching, carrying, lifting, house/yardwork, driving/computer work.  [] (08737) Therapist is in constant attendance of 2 or more patients providing skilled therapy interventions, but not providing any significant amount of measurable one-on-one time to either patient, for improvements in cervical, scapular, scapulothoracic and UE control with self care, reaching, carrying, lifting, house/yardwork, driving, computer work. Therapeutic Activities:    [x] (70771 or 47227) Provided verbal/tactile cueing for activities related to improving balance, coordination, kinesthetic sense, posture, motor skill, proprioception and motor activation to allow for proper function of cervical, scapular, scapulothoracic and UE control with self care, carrying, lifting, driving/computer work.      Home Exercise Program:    [x] (87185) Reviewed/Progressed HEP activities related to strengthening, flexibility, endurance, ROM of cervical, scapular, scapulothoracic and UE control with self care, reaching, carrying, lifting, house/yardwork, driving/computer work  [] (83404) Reviewed/Progressed HEP activities related to improving balance, coordination, kinesthetic sense, posture, motor skill, proprioception of cervical, scapular, scapulothoracic and UE control with self care, reaching, carrying, lifting, house/yardwork, driving/computer work      Manual Treatments:  PROM / STM / Oscillations-Mobs:  G-I, II, III, IV (PA's, Inf., Post.)  [x] (46636) Provided manual therapy to mobilize soft tissue/joints of cervical/CT, scapular GHJ and UE for the purpose of decreasing headache, modulating pain, promoting relaxation,  increasing ROM, reducing/eliminating soft tissue swelling/inflammation/restriction, improving soft tissue extensibility and allowing for proper ROM for normal function with self care, reaching, carrying, lifting, house/yardwork, driving/computer work    Charges:  Timed Code Treatment Minutes: 42   Total Treatment Minutes: 42       [] EVAL - LOW (95642)   [] EVAL - MOD (03102)  [] EVAL - HIGH (30991)  [] RE-EVAL (92698)  [x] AD(59887) x  2     [] Ionto  [] NMR (90278) x       [] Vaso  [x] Manual (48251) x  1     [] Ultrasound  [] TA x       [] Mech Traction (35174)  [] Aquatic Therapy x      [] ES (un) (93627):   [] Home Management Training x  [] ES(attended) (97854)   [] Dry Needling 1-2 muscles (73753):  [] Dry Needling 3+ muscles (867823  [] Group:      [] Other:     GOALS:  Patient stated goal: improve lifting, turning over in bed, reaching  [] Progressing: [] Met: [] Not Met: [] Adjusted    Therapist goals for Patient:   Short Term Goals: To be achieved in: 2 weeks  1. Independent in HEP and progression per patient tolerance, in order to prevent re-injury. [] Progressing: [] Met: [] Not Met: [] Adjusted  2. Patient will have a decrease in pain to facilitate improvement in movement, function, and ADLs as indicated by Functional Deficits. [] Progressing: [] Met: [] Not Met: [] Adjusted    Long Term Goals: To be achieved in: 8 weeks  1. Disability index score of 15% or less for the NDI to assist with reaching prior level of function. [] Progressing: [] Met: [] Not Met: [] Adjusted  2. Patient will demonstrate increased cervical AROM to [facility], pain-free, in order to turn over in bed without waking. [] Progressing: [] Met: [] Not Met: [] Adjusted  3.  Patient will demonstrate an increase in postural awareness and control and activation of deep cervical stabilizers to improve walking continued PT  [x] Continue per plan of care [] Alter current plan (see comments)  [] Plan of care initiated [] Hold pending MD visit [] Discharge    Electronically signed by: Chandan Decker PT       Note: If patient does not return for scheduled/ recommended follow up visits, this note will serve as a discharge from care along with most recent update on progress.

## 2021-10-21 ENCOUNTER — HOSPITAL ENCOUNTER (OUTPATIENT)
Dept: MRI IMAGING | Age: 76
Discharge: HOME | End: 2021-10-21
Payer: MEDICARE

## 2021-10-21 DIAGNOSIS — M54.2 NECK PAIN: ICD-10-CM

## 2021-10-21 PROCEDURE — 72141 MRI NECK SPINE W/O DYE: CPT

## 2021-10-22 ENCOUNTER — HOSPITAL ENCOUNTER (OUTPATIENT)
Dept: PHYSICAL THERAPY | Age: 76
Setting detail: THERAPIES SERIES
Discharge: HOME | End: 2021-10-22
Payer: MEDICARE

## 2021-10-22 ENCOUNTER — TELEPHONE (OUTPATIENT)
Dept: ORTHOPEDIC SURGERY | Age: 76
End: 2021-10-22

## 2021-10-22 PROCEDURE — 97110 THERAPEUTIC EXERCISES: CPT

## 2021-10-22 PROCEDURE — 97140 MANUAL THERAPY 1/> REGIONS: CPT

## 2021-10-22 NOTE — FLOWSHEET NOTE
[address]  Phone: [phone]   Fax: [phone]    Physical Therapy Treatment Note/ Progress Report:     Date:  10/22/2021    Patient Name:  Alexander Cardenas    :  1945  MRN: [id number]  Restrictions/Precautions:    Medical/Treatment Diagnosis Information:  Diagnosis: M25.511 (ICD-10-CM) - Right shoulder pain, unspecified chronicity; M54.2 (ICD-10-CM) - Neck pain; M75.81 (ICD-10-CM) - Tendinitis of right rotator cuff; M75.21 (ICD-10-CM) - Biceps tendinitis of right upper extremity  Treatment Diagnosis: Decreased B shoulder ROM and strength, Postural deficits w/decreased cervical spine AROM & instability  Insurance/Certification information:  PT Insurance Information: [employer] No copay, no prior auth  Physician Information:  Referring Practitioner: Amaury Jansen MD  Plan of care signed (Y/N): [x]  Yes []  No     Date of Patient follow up with Physician:  Dr. Ramon Alonso on 10/12     Progress Report: []  Yes  [x]  No     Date Range for reporting period:  Beginnin21  Ending:     Progress report due (10 Rx/or 30 days whichever is less): visit #10 or  (date)     Recertification due (POC duration/ or 90 days whichever is less): visit #16 or 21 (date)     Visit # Insurance Allowable Auth required? Date Range    mn []  Yes  [x]  No pcy       Latex Allergy:  [x]NO      []YES  Preferred Language for Healthcare:   [x]English       []other:    Functional Scale:       Date assessed:  NDI: raw score = 17; dysfunction = 34%       Pain level:  2-3/10     SUBJECTIVE:       Symptoms are controlled today, just having some general soreness in her arms. Feels that pain is improving overall, but the day after therapy has more pain and soreness. Had MRI yesterday afternoon, received results this am, no neural compression.          OBJECTIVE:     Observation:    Test measurements:      RESTRICTIONS/PRECAUTIONS: Exercises/Interventions:   Therapeutic Exercise (74643) Resistance / level Sets/sec Reps Notes   UBE   -fwd / bwd  3.5' / 2'     Pulley's    -flex & abd   20 ea    Cerv & scap stabs  against counter top  -Chin tucks  -Chin tuck w/B scap retract  -Chin tuck w/cervical rotation  -Chin tuck w/B shld flex  -Chin tuck w/B GH ER  -Wall push-ups      -serratus punches           0#          10  15                       10/20: unable to activate scap musculature so stopped and reattempted but still unable   Resistance bands  -mid row  -B GH ext  -GH ER  -no money ER  -PNF D1 ext  -B horiz abd   [employer]            15    15 B  15    Doorway chest stretch              Mat table  Wand flex  Wand chest press  Wand elbow flex & ext w/shld at 90 deg  Wand horiz abd/add  Serratus punch                             Therapeutic Activities (33601)                                                 NMR re-education (88232)                                          Manual Intervention (62060)       Cerv P/A mobs T2, T1, C7  1 mins     B shoulder PROM into each direction w/OP     Cervical spine distraction  20\"x4     Rib mobilizations       STM B scalenes, cerv paraspinals, SOR, SCM at origin    STM to B upper trap, B C7 TP, B cerv & thor paraspinals, B SCM at insertion        5 mins     Scalene stretch  Upper trap stretch    3x20\" R         Modalities:  10/1:  Declined MHP    Patient education:  9/28:  -pt educated on diagnosis, prognosis and expectations for rehab  -all pt questions were answered    Home Exercise Program:  Access Code: [email or web address]  URL: [email or web address]  Date: 09/28/2021  Prepared by: Lilibeth Garrison     Exercises  Gentle Levator Scapulae Stretch - 3 x daily - 7 x weekly - 3 sets - 20-30 secs hold  Supine Chin Tuck - 2 x daily - 7 x weekly - 10 reps  Seated Scapular Retraction - 2 x daily - 7 x weekly - 10 reps  Supine Shoulder Flexion Extension AAROM with Dowel - 2 x daily - 7 x weekly - 10 reps    Therapeutic Exercise and NMR EXR  [x] (90008) Provided verbal/tactile cueing for activities related to strengthening, flexibility, endurance, ROM  for improvements in cervical, postural, scapular, scapulothoracic and UE control with self care, reaching, carrying, lifting, house/yardwork, driving/computer work.    [] (22659) Provided verbal/tactile cueing for activities related to improving balance, coordination, kinesthetic sense, posture, motor skill, proprioception  to assist with cervical, scapular, scapulothoracic and UE control with self care, reaching, carrying, lifting, house/yardwork, driving/computer work.  [] (76487) Therapist is in constant attendance of 2 or more patients providing skilled therapy interventions, but not providing any significant amount of measurable one-on-one time to either patient, for improvements in cervical, scapular, scapulothoracic and UE control with self care, reaching, carrying, lifting, house/yardwork, driving, computer work. Therapeutic Activities:    [x] (63635 or 06951) Provided verbal/tactile cueing for activities related to improving balance, coordination, kinesthetic sense, posture, motor skill, proprioception and motor activation to allow for proper function of cervical, scapular, scapulothoracic and UE control with self care, carrying, lifting, driving/computer work.      Home Exercise Program:    [x] (27089) Reviewed/Progressed HEP activities related to strengthening, flexibility, endurance, ROM of cervical, scapular, scapulothoracic and UE control with self care, reaching, carrying, lifting, house/yardwork, driving/computer work  [] (04452) Reviewed/Progressed HEP activities related to improving balance, coordination, kinesthetic sense, posture, motor skill, proprioception of cervical, scapular, scapulothoracic and UE control with self care, reaching, carrying, lifting, house/yardwork, driving/computer work      Manual Treatments: PROM / STM / Oscillations-Mobs:  G-I, II, III, IV (PA's, Inf., Post.)  [x] (34896) Provided manual therapy to mobilize soft tissue/joints of cervical/CT, scapular GHJ and UE for the purpose of decreasing headache, modulating pain, promoting relaxation,  increasing ROM, reducing/eliminating soft tissue swelling/inflammation/restriction, improving soft tissue extensibility and allowing for proper ROM for normal function with self care, reaching, carrying, lifting, house/yardwork, driving/computer work    Charges:  Timed Code Treatment Minutes: 44   Total Treatment Minutes: 44       [] EVAL - LOW (34494)   [] EVAL - MOD (39910)  [] EVAL - HIGH (23583)  [] RE-EVAL (08167)  [x] SL(68148) x  2     [] Ionto  [] NMR (99209) x       [] Vaso  [x] Manual (24499) x  1     [] Ultrasound  [] TA x       [] Mech Traction (38812)  [] Aquatic Therapy x      [] ES (un) (87991):   [] Home Management Training x  [] ES(attended) (26261)   [] Dry Needling 1-2 muscles (33472):  [] Dry Needling 3+ muscles (247336  [] Group:      [] Other:     GOALS:  Patient stated goal: improve lifting, turning over in bed, reaching  [] Progressing: [] Met: [] Not Met: [] Adjusted    Therapist goals for Patient:   Short Term Goals: To be achieved in: 2 weeks  1. Independent in HEP and progression per patient tolerance, in order to prevent re-injury. [] Progressing: [] Met: [] Not Met: [] Adjusted  2. Patient will have a decrease in pain to facilitate improvement in movement, function, and ADLs as indicated by Functional Deficits. [] Progressing: [] Met: [] Not Met: [] Adjusted    Long Term Goals: To be achieved in: 8 weeks  1. Disability index score of 15% or less for the NDI to assist with reaching prior level of function. [] Progressing: [] Met: [] Not Met: [] Adjusted  2. Patient will demonstrate increased cervical AROM to [facility], pain-free, in order to turn over in bed without waking. [] Progressing: [] Met: [] Not Met: [] Adjusted  3.  Patient will demonstrate an increase in postural awareness and control and activation of deep cervical stabilizers to improve walking tolerance to PLOF levels. [] Progressing: [] Met: [] Not Met: [] Adjusted  4. Patient will improve B shoulder flex & abd AROM to 150 deg in order to improve upper body dressing. [] Progressing: [] Met: [] Not Met: [] Adjusted  5. Patient will improve BUE strength to 4/5 throughout in order to lift objects pain-free. [] Progressing: [] Met: [] Not Met: [] Adjusted     Overall Progression Towards Functional goals/ Treatment Progress Update:  [] Patient is progressing as expected towards functional goals listed. [] Progression is slowed due to complexities/Impairments listed. [] Progression has been slowed due to co-morbidities. [x] Plan just implemented, too soon to assess goals progression <30days   [] Goals require adjustment due to lack of progress  [] Patient is not progressing as expected and requires additional follow up with physician  [] Other    Persisting Functional Limitations/Impairments:  []Sitting [x]Standing   [x]Walking []Squatting/bending    [x]Stairs [x]ADL's    [x]Transfers [x]Reaching  [x]Housework [x]Lifting  []Driving []Job related tasks  [x]Sports/Recreation  [x]Sleeping  []Other:    ASSESSMENT:      Patient able to position R scapula well during D1 ext, but did require manual and verbal cueing for correct technique, was able to maintain afterwards. Had increased difficulty with L scapula and all other positions. Continue to educate and strengthen periscapular muscles to further progress postural deficits and pain. Pt needs to schedule additional sessions.     Treatment/Activity Tolerance:  [x] Patient able to complete tx  [] Patient limited by fatique  [] Patient limited by pain   [] Patient limited by other medical complications  [] Other: apprehension to PROM    Prognosis: [x] Good [] Fair  [] Poor    Patient Requires Follow-up: [x] Yes  [] No    PLAN: See deana PT 2x / week for 8 weeks. -pt scheduled 4 weeks until after apt w/spinal doctor to determine appropriateness of continued PT  [x] Continue per plan of care [] Alter current plan (see comments)  [] Plan of care initiated [] Hold pending MD visit [] Discharge    Electronically signed by: Chandan Decker PT       Note: If patient does not return for scheduled/ recommended follow up visits, this note will serve as a discharge from care along with most recent update on progress.

## 2021-10-25 ENCOUNTER — HOSPITAL ENCOUNTER (OUTPATIENT)
Dept: PHYSICAL THERAPY | Age: 76
Setting detail: THERAPIES SERIES
Discharge: HOME | End: 2021-10-25
Payer: MEDICARE

## 2021-10-25 PROCEDURE — 97530 THERAPEUTIC ACTIVITIES: CPT

## 2021-10-25 PROCEDURE — 97140 MANUAL THERAPY 1/> REGIONS: CPT

## 2021-10-25 PROCEDURE — 97110 THERAPEUTIC EXERCISES: CPT

## 2021-10-25 NOTE — FLOWSHEET NOTE
[address]  Phone: [phone]   Fax: [phone]    Physical Therapy Treatment Note/ Progress Report:     Date:  10/25/2021    Patient Name:  Sean Figueredo    :  1945  MRN: [id number]  Restrictions/Precautions:    Medical/Treatment Diagnosis Information:  Diagnosis: M25.511 (ICD-10-CM) - Right shoulder pain, unspecified chronicity; M54.2 (ICD-10-CM) - Neck pain; M75.81 (ICD-10-CM) - Tendinitis of right rotator cuff; M75.21 (ICD-10-CM) - Biceps tendinitis of right upper extremity  Treatment Diagnosis: Decreased B shoulder ROM and strength, Postural deficits w/decreased cervical spine AROM & instability  Insurance/Certification information:  PT Insurance Information: [facility]. No copay, no prior auth  Physician Information:  Referring Practitioner: Elana Nageotte, MD  Plan of care signed (Y/N): [x]  Yes []  No     Date of Patient follow up with Physician:  Dr. Jovan Noe on 10/12     Progress Report: []  Yes  [x]  No     Date Range for reporting period:  Beginnin21  PN: 10/25  Ending:     Progress report due (10 Rx/or 30 days whichever is less): visit #10 or  (date)     Recertification due (POC duration/ or 90 days whichever is less): visit #16 or 21 (date)     Visit # Insurance Allowable Auth required? Date Range    mn []  Yes  [x]  No pcy       Latex Allergy:  [x]NO      []YES  Preferred Language for Healthcare:   [x]English       []other:    Functional Scale:       Date assessed:  NDI: raw score = 17; dysfunction = 34%     NDI: raw score = 16; dysfunction = 32%   10/25    Pain level:  2-3/10     SUBJECTIVE:       Having some soreness in R upper arm up into shoulder area. Hasn't done much so doesn't feel that pain is too bad. Continues to have increased pain in arms and legs when rolling over at night, uses pillows to keep her in place, is able to sleep longer however.   Still having pain w/upper body dressing as well, donning and doffing. Still having difficulty reaching into cabinets, especially if she is holding something. Has been performing HEP well.          OBJECTIVE:      Test measurements:    10/25:  Seated shoulder AROM Flex 146 deg R -pn began at 90 deg, 149 deg L         Abd 85 deg R -incr pn throughout movement, 132 deg L      GH IR L2 R, WNL L  Cervical AROM:   Flex 49 deg   SB 15 deg R, 17 deg L       RESTRICTIONS/PRECAUTIONS:      Exercises/Interventions:   Therapeutic Exercise (24891) Resistance / level Sets/sec Reps Notes   UBE   -fwd / bwd  3' / 2'     Pulley's    -flex & abd   20 ea    Cerv & scap stabs  against counter top  -Chin tucks  -Chin tuck w/B scap retract  -Chin tuck w/cervical rotation  -Chin tuck w/B shld flex  -Chin tuck w/B GH ER  -Wall push-ups      -serratus punches           0#                                 10/20: unable to activate scap musculature so stopped and reattempted but still unable   Resistance bands  -mid row  -B GH ext  -GH ER  -no money ER  -PNF D1 ext  -B horiz abd   [facility]                    Doorway chest stretch              Mat table  Wand flex  Wand chest press  Wand elbow flex & ext w/shld at 90 deg  Wand horiz abd/add  Serratus punch                             Therapeutic Activities (34858)                                                 NMR re-education (54841)                                          Manual Intervention (31078)       Cerv P/A mobs T2, T1, C7  2 mins     B shoulder PROM into each direction w/OP     Cervical spine distraction  20\"x3     Rib mobilizations       STM B scalenes, cerv paraspinals, SOR, SCM at origin    STM to B upper trap, B C7 TP, B cerv & thor paraspinals, B SCM at insertion        5 mins     Scalene stretch  Upper trap stretch    SCM stretch    3x20\" R, 2x20\" L  20\"x2 B         Modalities:  10/1:  Declined MHP    Patient education:      9/28:  -pt educated on diagnosis, prognosis and expectations for rehab  -all pt questions were answered    Home Exercise Program:  Access Code: [email or web address]  URL: [email or web address]  Date: 09/28/2021  Prepared by: Сергей Romo     Exercises  Gentle Levator Scapulae Stretch - 3 x daily - 7 x weekly - 3 sets - 20-30 secs hold  Supine Chin Tuck - 2 x daily - 7 x weekly - 10 reps  Seated Scapular Retraction - 2 x daily - 7 x weekly - 10 reps  Supine Shoulder Flexion Extension AAROM with Dowel - 2 x daily - 7 x weekly - 10 reps    Therapeutic Exercise and NMR EXR  [x] (36610) Provided verbal/tactile cueing for activities related to strengthening, flexibility, endurance, ROM  for improvements in cervical, postural, scapular, scapulothoracic and UE control with self care, reaching, carrying, lifting, house/yardwork, driving/computer work.    [] (68017) Provided verbal/tactile cueing for activities related to improving balance, coordination, kinesthetic sense, posture, motor skill, proprioception  to assist with cervical, scapular, scapulothoracic and UE control with self care, reaching, carrying, lifting, house/yardwork, driving/computer work.  [] (13488) Therapist is in constant attendance of 2 or more patients providing skilled therapy interventions, but not providing any significant amount of measurable one-on-one time to either patient, for improvements in cervical, scapular, scapulothoracic and UE control with self care, reaching, carrying, lifting, house/yardwork, driving, computer work. Therapeutic Activities:    [x] (66221 or 61283) Provided verbal/tactile cueing for activities related to improving balance, coordination, kinesthetic sense, posture, motor skill, proprioception and motor activation to allow for proper function of cervical, scapular, scapulothoracic and UE control with self care, carrying, lifting, driving/computer work.      Home Exercise Program:    [x] (00273) Reviewed/Progressed HEP activities related to strengthening, flexibility, endurance, ROM of cervical, scapular, scapulothoracic and UE control with self care, reaching, carrying, lifting, house/yardwork, driving/computer work  [] (54072) Reviewed/Progressed HEP activities related to improving balance, coordination, kinesthetic sense, posture, motor skill, proprioception of cervical, scapular, scapulothoracic and UE control with self care, reaching, carrying, lifting, house/yardwork, driving/computer work      Manual Treatments:  PROM / STM / Oscillations-Mobs:  G-I, II, III, IV (PA's, Inf., Post.)  [x] (78198) Provided manual therapy to mobilize soft tissue/joints of cervical/CT, scapular GHJ and UE for the purpose of decreasing headache, modulating pain, promoting relaxation,  increasing ROM, reducing/eliminating soft tissue swelling/inflammation/restriction, improving soft tissue extensibility and allowing for proper ROM for normal function with self care, reaching, carrying, lifting, house/yardwork, driving/computer work    Charges:  Timed Code Treatment Minutes: 46   Total Treatment Minutes: 46       [] EVAL - LOW (81506)   [] EVAL - MOD (87224)  [] EVAL - HIGH (69185)  [] RE-EVAL (72063)  [x] NW(65229) x  1     [] Ionto  [] NMR (81682) x       [] Vaso  [x] Manual (64671) x  1     [] Ultrasound  [x] TA x  1     [] Mech Traction (61701)  [] Aquatic Therapy x      [] ES (un) (83594):   [] Home Management Training x  [] ES(attended) (29831)   [] Dry Needling 1-2 muscles (21744):  [] Dry Needling 3+ muscles (206919  [] Group:      [] Other:     GOALS:  Patient stated goal: improve lifting, turning over in bed, reaching  [x] Progressing: [] Met: [] Not Met: [] Adjusted    Therapist goals for Patient:   Short Term Goals: To be achieved in: 2 weeks  1. Independent in HEP and progression per patient tolerance, in order to prevent re-injury. [] Progressing: [x] Met: [] Not Met: [] Adjusted  2.  Patient will have a decrease in pain to facilitate improvement in movement, function, and ADLs as indicated by Functional Deficits. [] Progressing: [x] Met: [] Not Met: [] Adjusted    Long Term Goals: To be achieved in: 8 weeks  1. Disability index score of 15% or less for the NDI to assist with reaching prior level of function. [x] Progressing: [] Met: [] Not Met: [] Adjusted  2. Patient will demonstrate increased cervical AROM to [facility], pain-free, in order to turn over in bed without waking. [x] Progressing: [] Met: [] Not Met: [] Adjusted  3. Patient will demonstrate an increase in postural awareness and control and activation of deep cervical stabilizers to improve walking tolerance to PLOF levels. [x] Progressing: [] Met: [] Not Met: [] Adjusted  4. Patient will improve B shoulder flex & abd AROM to 150 deg in order to improve upper body dressing. [x] Progressing: [] Met: [] Not Met: [] Adjusted  5. Patient will improve BUE strength to 4/5 throughout in order to lift objects pain-free. [x] Progressing: [] Met: [] Not Met: [] Adjusted     Overall Progression Towards Functional goals/ Treatment Progress Update:  [] Patient is progressing as expected towards functional goals listed. [x] Progression is slowed due to complexities/Impairments listed. [] Progression has been slowed due to co-morbidities. [] Plan just implemented, too soon to assess goals progression <30days   [] Goals require adjustment due to lack of progress  [] Patient is not progressing as expected and requires additional follow up with physician  [] Other    Persisting Functional Limitations/Impairments:  []Sitting [x]Standing   [x]Walking []Squatting/bending    [x]Stairs [x]ADL's    [x]Transfers [x]Reaching  [x]Housework [x]Lifting  []Driving []Job related tasks  [x]Sports/Recreation  [x]Sleeping  []Other:    ASSESSMENT:       Pt demonstrating improvements in L shoulder and cervical AROM as well as R shoulder flexion, however abd and IR has significantly decreased.   Pain is present throughout R shoulder abd and IR and after 90 deg of flexion. PROM of R shoulder into each direction is [facility]. Pt does continue to have difficulty maintaining good cervical spine posture, allowing for R SB w/forward head and rounded shoulders while sitting. Patient can continue to benefit from PT services to further progress cervical spine and scap stabs. Treatment/Activity Tolerance:  [x] Patient able to complete tx  [] Patient limited by fatique  [] Patient limited by pain   [] Patient limited by other medical complications  [] Other: apprehension to PROM    Prognosis: [x] Good [] Fair  [] Poor    Patient Requires Follow-up: [x] Yes  [] No    PLAN: See eval. PT 2x / week for 8 weeks. -pt scheduled 4 weeks until after apt w/spinal doctor to determine appropriateness of continued PT  [x] Continue per plan of care [] Alter current plan (see comments)  [] Plan of care initiated [] Hold pending MD visit [] Discharge    Electronically signed by: Nida Melton PT       Note: If patient does not return for scheduled/ recommended follow up visits, this note will serve as a discharge from care along with most recent update on progress.

## 2021-11-12 ENCOUNTER — OFFICE VISIT (OUTPATIENT)
Dept: ORTHOPEDIC SURGERY | Age: 76
End: 2021-11-12

## 2021-11-12 VITALS — BODY MASS INDEX: 20.24 KG/M2 | TEMPERATURE: 97.6 F | WEIGHT: 110 LBS | HEIGHT: 62 IN

## 2021-11-12 DIAGNOSIS — M79.662 PAIN IN LEFT LOWER LEG: ICD-10-CM

## 2021-11-12 DIAGNOSIS — M25.562 LEFT KNEE PAIN, UNSPECIFIED CHRONICITY: Primary | ICD-10-CM

## 2021-11-12 PROCEDURE — MISC265 BAUERFEIND GENUTRAIN KNEE SLEEVE: Performed by: PHYSICIAN ASSISTANT

## 2021-11-12 PROCEDURE — L3170 FOOT PLAS HEEL STABI PRE OTS: HCPCS | Performed by: PHYSICIAN ASSISTANT

## 2021-11-12 PROCEDURE — 99214 OFFICE O/P EST MOD 30 MIN: CPT | Performed by: PHYSICIAN ASSISTANT

## 2021-11-12 RX ORDER — MELOXICAM 15 MG/1
15 TABLET ORAL DAILY
Qty: 30 TABLET | Refills: 0 | Status: SHIPPED | OUTPATIENT
Start: 2021-11-12 | End: 2021-12-03

## 2021-11-12 NOTE — PROGRESS NOTES
Chief Complaint  Knee Pain (old patient/ new problem left knee pain)      History of Present Illness:  Garfield Lieberman is a pleasant 67 y.o. female here for evaluation of left knee pain. Patient states that she has had consistent achy pain on the lateral aspect of her left knee for a while now but this is worsened over the past 3 weeks. Patient states that she is accustomed to taking long walks but she has been unable to do so recently because of pain. She states that her pain now shoots down her leg and upper thigh. States that she is unable to go up a step with her left leg. Medical History:  Patient's medications, allergies, past medical, surgical, social and family histories were reviewed and updated as appropriate. Pain Assessment  Location of Pain: Knee  Location Modifiers: Left  Severity of Pain: 6  Quality of Pain: Sharp, Dull, Aching  Duration of Pain: Persistent  Frequency of Pain: Constant  Aggravating Factors: Stairs, Walking, Squatting  Limiting Behavior: Yes  Relieving Factors: Rest, Heat  Result of Injury: No  Work-Related Injury: No  Are there other pain locations you wish to document?: No  ROS: Review of systems reviewed from Patient History Form completed today and available in the patient's chart under the Media tab. Pertinent items are noted in HPI  Review of systems reviewed from Patient History Form completed today and available in the patient's chart under the Media tab. Vital Signs:  Temp 97.6 °F (36.4 °C)   Ht 5' 1.5\" (1.562 m)   Wt 110 lb (49.9 kg)   BMI 20.45 kg/m²     Left knee Exam:        Gait/Alignment: Valgus alignment                            Patella tracking: Normal      Inspection/Skin: No rashes are identified.     Effusion: Small effusion     Palpation: Lateral joint line tenderness. Mild crepitus.     Range of Motion: Full extension and 120° of flexion     Strength: Mild quadriceps weakness.     Ligamentous Stability: Pseudo-laxity is present laterally. Anterior and posterior cruciate ligaments were intact. Lateral collateral ligament is intact.     Neurologic and vascular: Skin is warm dry and well perfused. Sensation is intact to light touch over the knee.     Additional findings: Calf soft nontender. No patellar instability. Right knee examination:    Gait: No use of assistive devices. No antalgic gait. Alignment: normal alignment. Inspection/skin: Skin is intact without erythema or ecchymosis. No gross deformity. Palpation: mild crepitus. no joint line tenderness present. Range of Motion: There is full range of motion. Strength: Normal quadriceps development. Effusion: No effusion or swelling present. Ligamentous stability: No cruciate or collateral ligament instability. Neurologic and vascular: Skin is warm and well-perfused. Sensation is intact to light-touch. Special tests: Negative Damon sign. Radiology:       Pertinent imaging was interpreted and reviewed with the patient today, images only - no report available. Radiographs were obtained and reviewed in the office; 4 views: bilateral PA, bilateral Clarance Manuela, bilateral Merchants AND left lateral    Impression: Advanced lateral compartment osteoarthritis of the left knee    Radiographs were obtained of the tib-fib which showed: Advanced osteoarthritis of the left knee. No changes appreciated in the tibia or fibula. Assessment : 71-year-old female with severe advanced left knee osteoarthritis with valgus alignment    Impression:  Encounter Diagnoses   Name Primary?  Left knee pain, unspecified chronicity Yes    Pain in left lower leg        Office Procedures:  Orders Placed This Encounter   Procedures    [employer] Genutrain Knee Sleeve $60     Patient was supplied a [employer] Genutrain Knee Sleeve. This retail item was supplied to provide functional support and assist in protecting the affected area.       Verbal and written instructions for the use of and application of this item were provided. The patient was educated and fit by a healthcare professional with expert knowledge and specialization in brace application. They were instructed to contact the office immediately should the equipment result in increased pain, decreased sensation, increased swelling or worsening of the condition.  XR KNEE LEFT (MIN 4 VIEWS)     Order Specific Question:   Reason for exam:     Answer:   pain    XR KNEE RIGHT (3 VIEWS)     Order Specific Question:   Reason for exam:     Answer:   pain    XR TIBIA FIBULA LEFT (2 VIEWS)     Order Specific Question:   Reason for exam:     Answer:   pain    Ambulatory referral to Physical Therapy     Referral Priority:   Routine     Referral Type:   Eval and Treat     Referral Reason:   Specialty Services Required     Requested Specialty:   Physical Therapy     Number of Visits Requested:   1    Visco K Heel Shoe Inserts     Patient was prescribed a Visco K Heel Shoe Insert. The left knee will require protection / support from this orthosis to improve their function. The orthosis will assist in protecting the affected area, provide functional support and facilitate healing. The patient was educated and fit by a healthcare professional with expert knowledge and specialization in brace application while under the direct supervision of the treating physician. Verbal and written instructions for the use of and application of this item were provided. They were instructed to contact the office immediately should the brace result in increased pain, decreased sensation, increased swelling or worsening of the condition. Plan: Pertinent imaging was reviewed. The etiology, natural history, and treatment options for the disorder were discussed.   The roles of activity medication, antiinflammatories, injections, bracing, physical therapy, and surgical interventions were all described to the patient and questions were answered. Patient has advanced osteoarthritis of the left knee with valgus alignment. At this time she is a candidate for Visco heel K with a medial wedge, a Genutrain sleeve, formal supervised physical therapy, and meloxicam. If no improvement she would be a candidate for a steroid injection. .     Follow-up in 1 month or sooner if worsening symptoms  Dena Vuong is in agreement with this plan. All questions were answered to patient's satisfaction and was encouraged to call with any further questions. The patient was advised that NSAID-type medications have several potential side effects that include: gastrointestinal irritation including hemorrhage, renal injury, as well as an increased risk for heart attack and stroke. The patient was asked to take the medication with food and to stop if there is any symptoms of GI upset, including heartburn, nausea, increased gas or diarrhea. I asked the patient to contact their medical provider for vomiting, abdominal pain or black/bloody stools. The patient should have renal function testing per his medical provider periodically if the medication is taken on a regular basis. The patient should be alert for any swelling in the lower extremities and should stop taking the medication immediately and contact their medical provider should this occur. In addition, the patient should stop taking the medication immediately and contact their medical provider should there be any shortness of breath, fatigue and be evaluated in an emergency facility for any chest pain. The patient expresses understanding of these issues and questions were answered. Total time spent for evaluation, education, and development of treatment plan: 35 minutes    Gilmer Archer, Alex Jas Rosales  11/12/2021    This dictation was performed with a verbal recognition program (DRAGON) and it was checked for errors.   It is possible that there are still dictated errors within this office note. If so, please bring any areas to my attention for an addendum. All efforts were made to ensure that this office note is accurate.

## 2021-11-15 ENCOUNTER — PROCEDURE VISIT (OUTPATIENT)
Dept: NEUROLOGY | Age: 76
End: 2021-11-15
Payer: COMMERCIAL

## 2021-11-15 DIAGNOSIS — M79.602 BILATERAL ARM PAIN: Primary | ICD-10-CM

## 2021-11-15 DIAGNOSIS — M79.601 BILATERAL ARM PAIN: Primary | ICD-10-CM

## 2021-11-15 PROCEDURE — 95886 MUSC TEST DONE W/N TEST COMP: CPT | Performed by: PSYCHIATRY & NEUROLOGY

## 2021-11-15 PROCEDURE — 95911 NRV CNDJ TEST 9-10 STUDIES: CPT | Performed by: PSYCHIATRY & NEUROLOGY

## 2021-11-15 NOTE — PROGRESS NOTES
Liat Zavala M.D. [facility]) [facility]  Board Certified in [address], [address] [place] [address]    EMG / NERVE CONDUCTION STUDY      PATIENT:  Dena Vuong       DATE OF EM/15/21     YOB: 1945       REASON FOR EMG:   Neck pain, bilateral arm pain and numbness      REFERRING PHYSICIAN:  Jaquan Abernathy MD  [address],  [place] [address]     SUMMARY:   Bilateral median motor and sensory nerve studies were normal.  Bilateral ulnar motor nerve studies with slowing of conduction velocities across the elbow. Bilateral ulnar sensory nerve studies were normal.  The left radial sensory nerve study was normal.  Needle EMG of several muscles in the upper extremities bilaterally showed evidence of some chronic denervation in the left C5-C6 innervated muscles and in the left cervical paraspinal muscles. The right side was normal.      CLINICAL DIAGNOSIS:  Cervical radiculopathy versus neuropathy        EMG RESULTS:   1. 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 The left side is more involved than the right side. 2.  This patient also has mild chronic left C5-C6 radiculopathy. The right side is normal.      ---------------------------------------------  Liat Zavala M.D.   Electromyographer / Neurologist

## 2021-11-15 NOTE — PATIENT INSTRUCTIONS
Verbal consent was obtained from patient and/or patient's advocate for in office procedure with Dr. Cooper Shah (EMG or EEG).

## 2021-11-17 ENCOUNTER — TELEPHONE (OUTPATIENT)
Dept: ORTHOPEDIC SURGERY | Age: 76
End: 2021-11-17

## 2021-11-17 DIAGNOSIS — M79.602 BILATERAL ARM PAIN: Primary | ICD-10-CM

## 2021-11-17 DIAGNOSIS — M79.601 BILATERAL ARM PAIN: Primary | ICD-10-CM

## 2021-11-17 NOTE — TELEPHONE ENCOUNTER
----- Message from Av Busby MD sent at 11/16/2021  8:09 AM EST -----  EMG  Nerve compression at elbows  Mild nerve compression in neck  Should see Deven  Could try cervical epidural or come into office after seeing Zach Mojica

## 2021-12-01 ENCOUNTER — OFFICE VISIT (OUTPATIENT)
Dept: ORTHOPEDIC SURGERY | Age: 76
End: 2021-12-01
Payer: COMMERCIAL

## 2021-12-01 VITALS — WEIGHT: 110 LBS | BODY MASS INDEX: 20.77 KG/M2 | HEIGHT: 61 IN | TEMPERATURE: 98.1 F

## 2021-12-01 DIAGNOSIS — M17.12 PRIMARY OSTEOARTHRITIS OF LEFT KNEE: Primary | ICD-10-CM

## 2021-12-01 PROCEDURE — 99214 OFFICE O/P EST MOD 30 MIN: CPT | Performed by: ORTHOPAEDIC SURGERY

## 2021-12-01 NOTE — PROGRESS NOTES
Chief Complaint  Follow-up (left knee)      History of Present Illness:  Sasha Randolph is a pleasant 67 y.o. female follow-up regarding her left knee pain. At her last visit she was diagnosed with advanced left knee osteoarthritis with valgus alignment. She was prescribed meloxicam, placed in physical therapy and fit with a Visco heel K insert. Patient states that this has helped moderately, however she still has constant severe pain. Medical History:  Patient's medications, allergies, past medical, surgical, social and family histories were reviewed and updated as appropriate. ROS: Review of systems reviewed from Patient History Form completed today and available in the patient's chart under the Media tab. Pertinent items are noted in HPI  Review of systems reviewed from Patient History Form completed today and available in the patient's chart under the Media tab. Vital Signs: There were no vitals taken for this visit. Left Knee Exam:        Gait/Alignment: Valgus alignment                            Patella tracking: Normal      Inspection/Skin: No rashes are identified.     Effusion: Small effusion     Palpation: Lateral joint line tenderness. Mild crepitus.     Range of Motion: Full extension and 120° of flexion     Strength: Mild quadriceps weakness.     Ligamentous Stability: Pseudo-laxity is present laterally. Anterior and posterior cruciate ligaments were intact. Lateral collateral ligament is intact.     Neurologic and vascular: Skin is warm dry and well perfused. Sensation is intact to light touch over the knee.     Additional findings: Calf soft nontender. No patellar instability. Right knee examination:    Gait: No use of assistive devices. No antalgic gait. Alignment: normal alignment. Inspection/skin: Skin is intact without erythema or ecchymosis. No gross deformity. Palpation: mild crepitus. no joint line tenderness present.     Range of Motion: There is full range of motion. Strength: Normal quadriceps development. Effusion: No effusion or swelling present. Ligamentous stability: No cruciate or collateral ligament instability. Neurologic and vascular: Skin is warm and well-perfused. Sensation is intact to light-touch. Special tests: Negative Damon sign. Radiology:       Pertinent imaging was interpreted and reviewed with the patient today, images only - no report available. No new imaging was obtained during today's visit. Assessment :  66 yo female with left knee osteoarthritis with valgus knee alignment    Impression:  Encounter Diagnosis   Name Primary?  Primary osteoarthritis of left knee Yes       Office Procedures:  No orders of the defined types were placed in this encounter. Plan: Pertinent imaging was reviewed. The etiology, natural history, and treatment options for the disorder were discussed. The roles of activity medication, antiinflammatories, injections, bracing, physical therapy, and surgical interventions were all described to the patient and questions were answered. Patient has severe advanced left knee osteoarthritis with valgus alignment. She has tried conservative measures with little relief. We had a long discussion about her options today. I believe she is a candidate for total knee arthroplasty therefore I will refer her to see Dr. Mariano Wynn. Sierra Ott is in agreement with this plan. All questions were answered to patient's satisfaction and was encouraged to call with any further questions. Total time spent for evaluation, education, and development of treatment plan: 35 minutes    Alex Dolan [place] Connie  12/1/2021    During this exam, ITanvi PA-C, functioned as a scribe for Dr. Hue Baires. The history taking and physical examination were performed by Dr. Hue Baires.   All counseling during the appointment was performed between the patient and Dr. Mariia Levi. 12/1/2021 11:32 AM    This dictation was performed with a verbal recognition program (DRAGON) and it was checked for errors. It is possible that there are still dictated errors within this office note. If so, please bring any areas to my attention for an addendum. All efforts were made to ensure that this office note is accurate. I attest that I met personally with the patient, performed the described exam, reviewed the radiographic studies and medical records associated with this patient and supervised the services that are described above.      Armani Lindsay MD

## 2021-12-03 RX ORDER — MELOXICAM 15 MG/1
TABLET ORAL
Qty: 30 TABLET | Refills: 0 | Status: SHIPPED | OUTPATIENT
Start: 2021-12-03 | End: 2022-01-10

## 2021-12-14 ENCOUNTER — OFFICE VISIT (OUTPATIENT)
Dept: ORTHOPEDIC SURGERY | Age: 76
End: 2021-12-14
Payer: COMMERCIAL

## 2021-12-14 VITALS — BODY MASS INDEX: 20.01 KG/M2 | WEIGHT: 106 LBS | HEIGHT: 61 IN

## 2021-12-14 DIAGNOSIS — M25.562 LEFT KNEE PAIN, UNSPECIFIED CHRONICITY: Primary | ICD-10-CM

## 2021-12-14 DIAGNOSIS — M17.12 PRIMARY OSTEOARTHRITIS OF LEFT KNEE: ICD-10-CM

## 2021-12-14 PROCEDURE — 99214 OFFICE O/P EST MOD 30 MIN: CPT | Performed by: PHYSICIAN ASSISTANT

## 2021-12-14 NOTE — PROGRESS NOTES
Patient Name: Jessica Wilcox  : 1945  DOS: 2021        Chief Complaint:   Chief Complaint   Patient presents with    Knee Pain     LEFT KNEE-Referral from Dr. Nae New       History of Present Illness:  Jessica Wilcox is a 67 y.o. female who presents with a chief complaint of continued complaints of pain in the knee with irritation with walking, stairs and with overuse. Pain in the knee regularly with swelling and discomfort. Increase in pain over the past several years time. Patient has noted issues with her left knee for several years but really noting an increase in pain over the past year. She denies any fall trauma or injury denies numbness burning tingling radiating pains down the leg. She denies utilization of any assistive walking devices does use a Genutrain brace on the knee which does provide some relief. Rates her overall pain level as a 4 out of 10. She has not pursued injections of any kind at this point in time as she was recommended not to because she is in need of surgical intervention due to the severity of the arthritis. She notes that she used to be a very active individual used to walk 10 miles per day and that has time to increasingly decrease the amount of walking she has been doing due to the increasing pain across the knee. She notes pain mostly on the medial aspect of the knee today does not take any medications at present time besides meloxicam which does help significantly in regards to reducing her overall pain. Patient notes she still cannot walk greater than 15 minutes. She is trying to establish the next steps in regards to her treatment plan and is hopeful to avoid a big surgery if possible. Medical History  Current Medications:   Prior to Admission medications    Medication Sig Start Date End Date Taking?  Authorizing Provider   meloxicam (MOBIC) 15 MG tablet TAKE 1 TABLET BY MOUTH EVERY DAY 12/3/21   Ian Halsted, PA   alendronate [facility]) 70 MG tablet Take 70 mg by mouth    Historical Provider, MD   atorvastatin (LIPITOR) 10 MG tablet TAKE 1 TABLET (10 MG TOTAL) BY MOUTH DAILY. 4/24/17   Historical Provider, MD   atorvastatin (LIPITOR) 10 MG tablet Take 10 mg by mouth daily    Historical Provider, MD     Allergies: Allergies   Allergen Reactions    Acetaminophen-Codeine      TYLENOL #3    Demerol     Meperidine Nausea And Vomiting    Penicillins Rash       Review of Systems:     Review of Systems ______  Constitutional: Negative for fever and diaphoresis. ____________  Respiratory: Negative for shortness of breath.  ________  Gastrointestinal: Negative for abdominal pain. ________  Musculoskeletal: Positive for joint pain. ____  Skin: Negative for itching. ____  Neurological: Negative for loss of consciousness. ______________  All other systems reviewed and negative     Past Medical History:   Diagnosis Date    Arthritis     Cataract     Hyperlipidemia     Primary osteoarthritis of left knee 12/14/2021     No family history on file. Social History     Socioeconomic History    Marital status:      Spouse name: Not on file    Number of children: Not on file    Years of education: Not on file    Highest education level: Not on file   Occupational History    Not on file   Tobacco Use    Smoking status: Never Smoker    Smokeless tobacco: Never Used   Substance and Sexual Activity    Alcohol use: No    Drug use: No    Sexual activity: Not on file   Other Topics Concern    Not on file   Social History Narrative    Not on file     Social Determinants of Health     Financial Resource Strain:     Difficulty of Paying Living Expenses: Not on file   Food Insecurity:     Worried About Running Out of Food in the Last Year: Not on file    Yvette of Food in the Last Year: Not on file   Transportation Needs:     Lack of Transportation (Medical): Not on file    Lack of Transportation (Non-Medical):  Not on file   Physical Activity:  Days of Exercise per Week: Not on file    Minutes of Exercise per Session: Not on file   Stress:     Feeling of Stress : Not on file   Social Connections:     Frequency of Communication with Friends and Family: Not on file    Frequency of Social Gatherings with Friends and Family: Not on file    Attends Oriental orthodox Services: Not on file    Active Member of [address] [place] [address] [employer] or Organizations: Not on file    Attends Club or Organization Meetings: Not on file    Marital Status: Not on file   Intimate Partner Violence:     Fear of Current or Ex-Partner: Not on file    Emotionally Abused: Not on file    Physically Abused: Not on file    Sexually Abused: Not on file   Housing Stability:     Unable to Pay for Housing in the Last Year: Not on file    Number of [place] in the Last Year: Not on file    Unstable Housing in the Last Year: Not on file         Physical Exam __  Constitutional: She is oriented to person, place, and time and well-developed, well-nourished, and in no distress. No distress. ____  HENT:   Head: Normocephalic and atraumatic. ____  Eyes: Conjunctivae are normal. ________  Cardiovascular: Intact distal pulses. ____  Pulmonary/Chest: Effort normal. ________________________  Neurological: She is alert and oriented to person, place, and time. ____  Skin: Skin is dry. She is not diaphoretic. ____  Psychiatric: Mood, affect and judgment normal. ______          Assessment   Vital Signs: There were no vitals filed for this visit. left Knee shows evidence for DJD with valgus obvious pseudolaxity, pain with weight bearing, antalgic gait and palpable osteophytes. Inspection: Moderate anterior swelling. Swelling is present with mild effusion. The posterior aspect of the knee appears to be full with tenderness. There is no erythema, rash, or ecchymosis.      Range of Motion:  Right 0-125 left 0-125    Mild pain with varus and valgus testing    There is moderate valgus deformity noted    Strength:  Hamstrings rated: 4/5. Quadriceps rated: 4/5    Palpation: There is moderate tenderness along the medial joint line. Special Tests: Patellar Compression test is positive. Valgus & Varus test is positive. Skin: There are no rashes, ulcerations or lesions. Gait: Gait pattern is antalgic  Skin shows no rashes/ecchymosis to the affected area, no hyperesthesias, no discoloration, no temperature or color discrepancies. NEUROLOGICALLY: There is no evidence for sensory or motor deficits in the extremity. Coordination appears full with no spacticity or rigidity. Reflexes appear to be symmetric. Distal circulation intact. No signs of RSD. Additional Comments: Hip range of motion full and intact        Procedure(s): No new procedure performed at today's date    Diagnostic Test Findings:   Xray   Have reviewed the xrays above from 11/12/2021  4 view x-ray of the left knee AP, lateral, sunrise and tunnel views were performed on 11/12/2021 and reviewed today and my impression is: Severe valgus deformity with KL grade 4 changes to the lateral compartment with mild changes noted to the patellofemoral KL grade 1-2 and mild to moderate changes noted on the medial compartment KL grade 2. No evidence of acute fracture dislocation. Assessment and Plan:       Diagnosis Orders   1. Left knee pain, unspecified chronicity     2. Primary osteoarthritis of left knee                The orders below, if any, were placed during this visit:   No orders of the defined types were placed in this encounter. Treatment Plan:   -Trialed patient into a lateral  brace but patient did not tolerate due to pain and discomfort with the brace. Advised for continued utilization of Genutrain brace.  -Discussed potential pursuance of PRP injection with genicular nerve block to help with reduction in pain across the knee.   -Did also discuss potential pursuance of partial versus total knee arthroplasty with the patient and advised her patient to discuss this with her family at home and decide on the best potential avenue for pursuance of treatment.  -Advised her patient to follow-up in 3 weeks time for repeat evaluation and further discussion regarding management.  -Spent 35 minutes with patient discussing future and current treatment management plans.          SHIVA Oconnor

## 2021-12-17 ENCOUNTER — OFFICE VISIT (OUTPATIENT)
Dept: ORTHOPEDIC SURGERY | Age: 76
End: 2021-12-17
Payer: MEDICARE

## 2021-12-17 VITALS — BODY MASS INDEX: 20.01 KG/M2 | HEIGHT: 61 IN | WEIGHT: 106 LBS | RESPIRATION RATE: 15 BRPM

## 2021-12-17 DIAGNOSIS — G56.21 CUBITAL TUNNEL SYNDROME ON RIGHT: Primary | ICD-10-CM

## 2021-12-17 PROCEDURE — 99204 OFFICE O/P NEW MOD 45 MIN: CPT | Performed by: PHYSICIAN ASSISTANT

## 2021-12-17 NOTE — PATIENT INSTRUCTIONS
Pre-Operative Instructions    1. The night before your surgery, unless otherwise instructed, do not eat any food, drink any liquids, chew gum or mints after midnight. Abstain from alcohol for 24 hours prior to surgery. 2. You will be contacted by the Hospital the working day prior to your procedure to confirm your arrival time. 3. Patients under 25years of age must have a parent or legal guardian present to sign their consent and discharge paperwork. 4. On the day of surgery,  you will be seen pre-operatively by an anesthesiologist.     5. If you are having hand surgery, it is recommended that nail polish and acrylic nails be removed prior to surgery if possible. 6. Please bring cases for glasses, contact lenses, hearing aids or dentures. They will likely be removed prior to surgery. 7. Wear casual, loose-fitting and comfortable clothing. Consider that you may have a large dressing to fit under your clothing after surgery. 9. Please do not bring valuables such as jewelry or large sums of cash to the hospital. Remove all body piercings before coming to the hospital. Janene Boas may not  wear any rings on the hand if you are having surgery on that hand, wrist or elbow. 10. Do not smoke or chew tobacco before your surgery. [address] [place] [address] and surgery facilities are smoke-free environments. Smoking is not permitted anywhere on campus. 11. Be sure to follow any additional instructions from your physician. If the above conditions are not met, your surgery may be cancelled and rescheduled for another day. Should you develop any change in your health such as fever, cough, sore throat, cold, flu, or infection, or if you have any questions regarding your Pre-admission or surgery, please contact [address] - Surgery Scheduling at [phone], Monday through Friday, 9 a.m. to 5 p.m.

## 2021-12-17 NOTE — LETTER
[facility] SURGERY  Surgery Scheduling Form:  DEMOGRAPHICS:                                                                                                              .  Patient Name:  Royce Conley  Patient :  1945   Patient SS#:  [id number]    Patient Phone:  [phone] (home) [phone] (work) Alt. Patient Phone:    Patient Address:  [address] [address]    PCP:  Doug 33:    Payor/Plan Subscr  Sex Relation Sub. Ins. ID Effective Group Num   1. [address] 1945 Female Self [id number]                                   [address]   2. [employer]* Lulu Cart 1945 Female Self [id number] 10/1/21 [id number]                                   [address]     DIAGNOSIS AND PROCEDURE  Diagnosis:   right Cubital Tunnel Syndrome / Ulnar Nerve Entrapment AT Elbow (354.2)   G56.21  Operation:  right  Ulnar Nerve Decompression  (at Elbow)  [CPT: 40031]  Location:  [facility]  Surgeon:  Estephanie Beard    SCHEDULING INFORMATION:                                                                                         .  Surgeon's Scheduling Instruction:  elective    RN Post-op Appt:  [x] Yes   [] No  Preferred Thursday:   [] Yes   [] No    Requested Date:    OR Time:  9:30 Patient Arrival Time:    OR Time Required:  30  Minutes  Anesthesia:  General  Equipment:  None  Mini C-Arm:  No   Standard C-Arm:  No  Status:  Outpatient                                 COVID     PAT Required:  Yes  Comments:                      Radha Becerra MD  21 3:31 PM    BILLING INFORMATION:                                                                                                    .    Procedure:       CPT Code Modifier  right  Ulnar Nerve Decompression  (at Elbow)      .                  Pre Operative Physician Prophylaxis Orders - SCIP Protocols      Pre-Operative Antibiotic Order:    Allergies   Allergen Reactions    Acetaminophen-Codeine      TYLENOL #3    Demerol     Meperidine Nausea And Vomiting    Penicillins Rash          [x]  ----  No Antibiotic Ordered       []  ----  Give the following Antibiotic within 1 hour prior to start time:         Ancef 1 gram IV if patient is less than 200 pounds    or       Ancef 2 grams IV if patient is greater than 200 pounds    or      Vancomycin 1 gram IV (over 1 hour) if patient is allergic to           PENICILLINS or CEFALOSPORINS        SURG   1-27                               COVID    1-24                            H&P AT PCP    Procedure: right  Ulnar Nerve Decompression  (at Elbow)     Patient: Nanci Self  :    1945    Physician Signature:     Date: 21  Time: 3:31 PM

## 2021-12-17 NOTE — LETTER
CONSENT TO OPERATION  AND/OR OTHER PROCEDURE(S)          PATIENT : Freeman Baez   YOB: 1945      DATE : 12/17/21          1. I request and consent that Dr. Rita Artis,  and/or his associates or assistants perform an operation and/or procedures on the above patient at  [place] [address], to treat the condition(s) which appear indicated by the diagnostic studies already performed. I have been told that in general terms the nature, purpose and reasonable expectations of the operation and/or procedure(s) are:         right  Ulnar Nerve Decompression  (at Elbow)       2. It has been explained to me by the informing physician that during the course of the operation and/or procedure(s) unforeseen conditions may be revealed that necessitate an extension of the original operation and/or procedure(s) or different operation and/or procedures than those set forth in Paragraph 1. I therefore authorize and request that my physician and/or his associates or assistants perform such operations and/or procedures as are necessary and desirable in the exercise of professional judgment. The authority granted under this Paragraph 2 shall extend to all conditions that require treatment and are known to my physician at the time the operation is commenced. 3. I have been made aware by the informing physician of certain risks and consequences that are associated with the operation and/or procedure(s) described in Paragraph 1, the reasonable alternative methods or treatment, the possible consequences, the possibility that the operation and/or procedure(s) may be unsuccessful and the possibility of complications.   I understand the reasonably known risks to be:      - Bleeding  - Infection  - Poor Healing  - Possible Damage to Nerve, Vessel, Tendon/Muscle or Bone  - Need for further Treatment/Surgery  - Stiffness  - Pain  - Residual or Recurrent Symptoms  - Anesthetic and/or Medical Risks  - We have discussed the specific limitations and risks of hospital and/or office based treatment at this time due to the COVID-19 pandemic                I have been counseled about the risks of hermes Covid-19 in the lyric-operative and post-operative periods related to this procedure. I have been made aware that hermes Covid-19 around the time of a surgical procedure may worsen my prognosis for recovering from the virus and lend to a higher morbidity and or mortality risk. With this knowledge, I have requested to proceed with the procedure as scheduled. 4. I have also been informed by the informing physician that there are other risks from both known and unknown causes that are attendant to the performance of any surgical procedure. I am aware that the practice of medicine and surgery is not an exact science, and that no guarantees have been made to me concerning the results of the operation and/or procedure(s). 5. I   CONSENT / REFUSE CONSENT  (strike the phrase that does not apply) to the taking of photographs before, during and/or after the operation or procedure for scientific/educational purposes. 6. I consent to the administration of anesthesia and to the use of such anesthetics as may be deemed advisable by the anesthesiologist who has been engaged by me or my physician.     7. I certify that I have read and understand the above consent to operation and/or other procedure(s); that the explanations therein referred to were made to me by the informing physician in advance of my signing this consent; that all blanks or statements requiring insertion or completion were filled in and inapplicable paragraphs, if any, were stricken before I signed; and that all questions asked by me about the operation and/or procedure(s) which I have consented to have been fully answered in a satisfactory manner.                                 _______________________           12/17/21 Witness     Signature Of Patient         Date        Marti Carvalho                                                 Informing Physician                                           Signature of Informing Physician                              If patient is unable to sign or is a minor, complete one of the following:    (A)  Patient is a minor   years of age. (B)  Patient is unable to sign because: The undersigned represents that he or she is duly authorized to execute this consent for and on behalf of the above named patient. Witness               o  Parent  o  Guardian   o  Spouse       o  Other (specify)                                           Patient Name: Sierra Ott  Patient YOB: 1945  Dr. Kelly Sihn' Return To Work Policy  Regarding your ability to return to work after surgery or injury, Dr. Kelly Shin will not state that any patient is off of work or cannot work at all. He will place you on restrictions after your surgical procedure or injury. Depending on the details of your particular situation, Dr. Kelly Shin may state that you will have either light use or no use of your hand for a specific number of weeks. It is your obligation to communicate with your employer regarding your restrictions. It is your employer's decision as to whether they will accommodate your restrictions (i.e. allow you to come to work in your restricted capacity) or to not allow you to return to work under your restrictions. Dr. Kelly Shin does not participate in making this decision and cannot influence your employer regarding their decision. If you do not communicate your restrictions to your employer, or if you do not present to work as you are scheduled to, Dr. Kelly Shin will not provide an 'excuse' to explain your absence. A doctors note, or official forms (BWC, FMLA, etc.) will be filled out, upon request, to indicate your date of surgery and your restrictions as stated above.    [facility] Narcotic Policy  Patients will only be prescribed narcotics after surgical procedures or significant injury. Not all procedures cause pain great enough to require Narcotics and thus, not all patients will receive prescriptions after surgical procedures or injuries. Narcotics are never prescribed for chronic conditions. Narcotics are never prescribed for use longer than one week at a time. Refills are only granted in unusual circumstances and only at Dr. Valdez Crouch discretion. Patients who are receiving narcotic medication from another physician or who are under pain management contracts will not be given a prescription for narcotics for any reason. Surgery Arrival Time:  You have been advised of the START TIME for your surgery as well as the ARRIVAL TIME at which you need to arrive at the surgery facility. Please understand that there is a certain amount of preparation which takes place at the surgery facility prior to the start of your surgery. If you arrive later than your scheduled ARRIVAL TIME, it may be necessary to cancel your surgery for that day and reschedule your procedure due to lack of adequate time for pre-surgery preparations. Thank you for being on time for your arrival.    I have read the above policies and understand that by agreeing to proceed with treatment by Dr. Vivian Perez and his team, that I am agreeing to abide by these policies.   Patient Name:  Vika Singh    Patient Signature:  _____________________________    Juan J Lew Date:   12/17/21

## 2021-12-17 NOTE — PROGRESS NOTES
Ms. Freeman Baez is a 67 y.o. right handed woman  who is seen today in Hand Surgical Consultation at the request of [facility]. She presents today regarding Bilateral symptoms which have been present for approximately 2 years. A history of antecedent trauma or injury is Absent. She reports symptoms to include mild numbness & tingling in the forearm/upper arm. She states that she does not have any numbness and tingling in the fingers. Neurologic symptoms do not Frequently awaken her from sleep. She reports mild pain located in the Medial both elbows, with retrograde radiation . Symptoms are worsening over time. Previous treatment has included conservative measures. She does not claim relation of her symptoms to her required work activities. She has undergone electrodiagnostic testing. I have today reviewed with Freeman Baez the clinically relevant, past medical history, medications, allergies,  family history, social history, and Review Of Systems & I have documented any details relevant to today's presenting complaints in my history above. Ms. Kelin Pham self-reported past medical history, medications, allergies,  family history, social history, and Review Of Systems have been scanned into the chart under the \"Media\" tab. Physical Exam:  Ms. Kelin Morales's most recent vitals:  Vitals  Resp: 15  Height: 5' 1\" (154.9 cm)  Weight: 106 lb (48.1 kg)    She is well nourished, oriented to person, place & time. She demonstrates appropriate mood and affect as well as normal gait and station. Skin: Normal in appearance, Normal Color and Free of Lesions Bilaterally   Digital range of motion is full and equal bilaterally  Wrist range of motion is without significant limitation bilaterally  Elbow range of motion is without significant limitation bilaterally  Sensation is subjectively normal in the Whole Hand and objectively present in the same distribution bilaterally.   All other digits show normal sensation  Vascular examination reveals normal, good capillary refill and good color bilaterally  Swelling is mild about the elbow bilaterally  There is no evidence of gross joint instability bilaterally. Muscular strength is clinically appropriate bilaterally. Examination for Cubital Tunnel Syndrome bilaterally shows mild tenderness to palpation at the Medial epicondyle. The Ulnar Nerve rests behind the medial epicondyle without subluxation upon elbow flexion. Elbow flexion-compression test is Mildly Positive, and there is an active Tinnel's Sign over the Cubital Tunnel . The Ulnar Nerve innervated intrinsic musculature is not atrophied & weakened. Examination for Carpal Tunnel Syndrome shows Carpal Tunnel Compression Test to be negative bilaterally. Phalen's Maneuver is negative bilaterally. The thenar musculature shows no evidence of atrophy or weakness. Review of Electrodiagnostic Testing:  Electrodiagnostic Studies performed by another Physician outside of my practice were Personally Reviewed & Interpreted by myself today. Test performed on: 11/15/21    NERVE CONDUCTION STUDY:  RIGHT   Median Nerve: Sensory Latency: 3.18  Motor Latency: 3.02  Ulnar Nerve:  Conduction Velocity:  37    LEFT  Median Nerve: Sensory Latency: 3.13  Motor Latency: 2.81  Ulnar Nerve:  Conduction Velocity:  34.1    EMG:  RIGHT  Normal    LEFT  Abnormal  biceps, deltoid, cerv paraspinal    My Interpretation: This study is consistent with: Moderate RIGHT Ulnar Nerve Entrapment at the Cubital Tunnel, Moderate LEFT  Ulnar Nerve Entrapment at the Cubital Tunnel and mild chronic left C5-C6 radiculopathy        Impression:  Ms. Alexander Cardenas is showing clinical evidence of Ulnar Nerve entrapment at the Cubital Tunnel and presents requesting further treatment. Plan:  I have had a thorough discussion with Ms. Alexander Cardenas regarding the treatment options available for her initially presenting cubital tunnel syndrome, which is causing her significant  limitations. I have outlined for Ms. Sean Figueredo the benefits and consequences of the various treatment modalities, including the fact that surgical treatment is the only modality which is reasonably expected to provide long lasting or permanent resolution of her symptoms. Based upon our current discussion and a reasonable understating of the options available to her, Ms. Sean Figueredo has selected to proceed with surgical Ulnar Nerve decompression at the Cubital Tunnel. I have discussed the details of the surgical procedure, the pre, lyric and postoperative concerns and the appropriate expectations after surgery with Ms. Sean Figueredo today. She was given the opportunity to ask questions, voiced an understanding of the procedure, and she did wish to proceed with Right Ulnar Nerve decompression at the Cubital Tunnel. I had an extensive discussion with Ms. Sean Figueredo (and any family members present with her today) regarding the natural history, etiology, and long term consequences of this problem. We have mutually selected a surgical treatment plan  and, in my opinion, surgical intervention is indicated at this time. I have discussed with her the potential complications, limitations, expectations, alternatives, and risks of Ulnar Nerve decompression at the Cubital Tunnel. She has had full opportunity to ask her questions. I have answered them all to her satisfaction. I feel that Ms. Sean Figueredo (and any family members present with her today) do understand our discussion today and she has provided informed consent for Right Ulnar Nerve decompression at the Cubital Tunnel. I have also discussed with Ms. Sean Figueredo the other treatment options available to her for this condition. We have today selected to proceed with Surgical treatment.   She has voiced and  understanding that there are other less aggressive treatment options which are available in this

## 2021-12-22 ENCOUNTER — TELEPHONE (OUTPATIENT)
Dept: ORTHOPEDIC SURGERY | Age: 76
End: 2021-12-22

## 2021-12-22 DIAGNOSIS — G56.21 CUBITAL TUNNEL SYNDROME ON RIGHT: Primary | ICD-10-CM

## 2022-01-07 ENCOUNTER — TELEPHONE (OUTPATIENT)
Dept: ORTHOPEDIC SURGERY | Age: 77
End: 2022-01-07

## 2022-01-07 NOTE — TELEPHONE ENCOUNTER
CPT: [id number]  BODY PART: right elbow  STATUS: outpatient  AUTHORIZATION: [employer]    Per [employer] website, [address] ID# [id number]

## 2022-01-10 RX ORDER — MELOXICAM 15 MG/1
TABLET ORAL
Qty: 30 TABLET | Refills: 0 | Status: SHIPPED | OUTPATIENT
Start: 2022-01-10

## 2022-01-19 ENCOUNTER — TELEPHONE (OUTPATIENT)
Dept: ORTHOPEDIC SURGERY | Age: 77
End: 2022-01-19

## 2022-01-19 NOTE — TELEPHONE ENCOUNTER
Pt calling stating she was to have other hand 3 weeks later,   I dont see it on consent form.   Please advise   [phone]

## 2022-01-19 NOTE — LETTER
Reactions    Acetaminophen-Codeine      TYLENOL #3    Demerol     Meperidine Nausea And Vomiting    Penicillins Rash          [x]  ----  No Antibiotic Ordered       []  ----  Give the following Antibiotic within 1 hour prior to start time:         Ancef 1 gram IV if patient is less than 200 pounds    or       Ancef 2 grams IV if patient is greater than 200 pounds    or      Vancomycin 1 gram IV (over 1 hour) if patient is allergic to           PENICILLINS or CEFALOSPORINS        SURG  2-17                               COVID     2-11                        H&P AT PCP    Procedure: left  Ulnar Nerve Decompression  (at Elbow)     Patient: Samantha Mora  :    1945    Physician Signature:     Date: 22  Time: 4:28 PM

## 2022-01-19 NOTE — TELEPHONE ENCOUNTER
Pink card is in          [place] with the patient and I let her know that Kaiden Carter would call her to schedule her left side when she can.

## 2022-01-20 DIAGNOSIS — G56.22 CUBITAL TUNNEL SYNDROME ON LEFT: Primary | ICD-10-CM

## 2022-01-20 NOTE — PROGRESS NOTES
Covid testing to be done @  If positive---Pt instructed to notify MD ASAP   If negative--pt was instructed to bring results DOP/DOSPreoperative Screening for Elective Surgery/Invasive Procedures While COVID-19 present in the community     1. Have you tested positive or have been told to self-isolate for COVID-19 like symptoms within the past 28 days?no  2. Do you currently have any of the following symptoms?no  ? Fever >100.0 F or 99.9 F in immunocompromised patients? ? New onset cough, shortness of breath or difficulty breathing? ? New onset sore throat, myalgia (muscle aches and pains), headache, loss of taste/smell or diarrhea? 3. Have you had a potential exposure to COVID-19 within the past 14 days by:no  ? Close contact with a confirmed case? ? Close contact with a healthcare worker,  or essential infrastructure worker (grocery store, [employer], gas station, public utilities or transportation)?no  ? Do you reside in a congregate setting such as; skilled nursing facility, adult home, correctional facility, homeless shelter or other institutional setting? ? Have you had recent travel to a known COVID-19 hotspot? Indicate if the patient has a positive screen by answering yes to one or more of the above questions. [address]  time____________        Surgery time____________    Take the following medications with a sip of water:    Do not eat or drink anything after 12:00 midnight prior to your surgery. This includes water chewing gum, mints and ice chips. You may brush your teeth and gargle the morning of your surgery, but do not swallow the water     Please see your family doctor/pediatrician for a history and physical and/or concerning medications. Bring any test results/reports from your physicians office.    If you are under the care of a heart doctor or specialist doctor, please be aware that you may be asked to them for clearance    You may be asked to stop blood thinners such as Coumadin, Plavix, Fragmin, Lovenox, etc., or any anti-inflammatories such as:  Aspirin, Ibuprofen, Advil, Naproxen prior to your surgery. We also ask that you stop any OTC medications such as fish oil, vitamin E, glucosamine, garlic, Multivitamins, COQ 10, etc.    We ask that you do not smoke 24 hours prior to surgery  We ask that you do not  drink any alcoholic beverages 24 hours prior to surgery     You must make arrangements for a responsible adult to take you home after your surgery. For your safety you will not be allowed to leave alone or drive yourself home. Your surgery will be cancelled if you do not have a ride home. Also for your safety, it is strongly suggested that someone stay with you the first 24 hours after your surgery. A parent or legal guardian must accompany a child scheduled for surgery and plan to stay at the hospital until the child is discharged. Please do not bring other children with you. For your comfort, please wear simple loose fitting clothing to the hospital.  Please do not bring valuables. Do not wear any make-up or nail polish on your fingers or toes      For your safety, please do not wear any jewelry or body piercing's on the day of surgery. All jewelry must be removed. If you have dentures, they will be removed before going to operating room. For your convenience, we will provide you with a container. If you wear contact lenses or glasses, they will be removed, please bring a case for them. If you have a living will and a durable power of  for healthcare, please bring in a copy. As part of our patient safety program to minimize surgical site infections, we ask you to do the following:    · Please notify your surgeon if you develop any illness between         now and the  day of your surgery.     · This includes a cough, cold, fever, sore throat, nausea,         or vomiting, and diarrhea, etc.  ·  Please notify your surgeon if you experience dizziness, shortness         of breath or blurred vision between now and the time of your surgery. Do not shave your operative site 96 hours prior to surgery. For face and neck surgery, men may use an electric razor 48 hours   prior to surgery. You may shower the night before surgery or the morning of   your surgery with an antibacterial soap. You will need to bring a photo ID and insurance card    [facility] has an onsite pharmacy, would you like to utilize our pharmacy     If you will be staying overnight and use a C-pap machine, please bring   your C-pap to hospital     Our goal is to provide you with excellent care, therefore, visitors will be limited to two(2) in the room at a time so that we may focus on providing this care for you. Please contact pre-admission testing if you have any further questions. [facility] phone number:  [phone] PAT fax number:  [phone]  Please note these are generalized instructions for all surgical cases, you may be provided with more specific instructions according to your surgery. [address]  time__0805_________        Surgery time_____0935______    Take the following medications with a sip of water:    Do not eat or drink anything after 12:00 midnight prior to your surgery. This includes water chewing gum, mints and ice chips. You may brush your teeth and gargle the morning of your surgery, but do not swallow the water     Please see your family doctor/pediatrician for a history and physical and/or concerning medications. Bring any test results/reports from your physicians office.    If you are under the care of a heart doctor or specialist doctor, please be aware that you may be asked to them for clearance    You may be asked to stop blood thinners such as Coumadin, Plavix, Fragmin, Lovenox, etc., or any anti-inflammatories such as:  Aspirin, Ibuprofen, Advil, Naproxen prior to your surgery. We also ask that you stop any OTC medications such as fish oil, vitamin E, glucosamine, garlic, Multivitamins, COQ 10, etc.    We ask that you do not smoke 24 hours prior to surgery  We ask that you do not  drink any alcoholic beverages 24 hours prior to surgery     You must make arrangements for a responsible adult to take you home after your surgery. For your safety you will not be allowed to leave alone or drive yourself home. Your surgery will be cancelled if you do not have a ride home. Also for your safety, it is strongly suggested that someone stay with you the first 24 hours after your surgery. A parent or legal guardian must accompany a child scheduled for surgery and plan to stay at the hospital until the child is discharged. Please do not bring other children with you. For your comfort, please wear simple loose fitting clothing to the hospital.  Please do not bring valuables. Do not wear any make-up or nail polish on your fingers or toes      For your safety, please do not wear any jewelry or body piercing's on the day of surgery. All jewelry must be removed. If you have dentures, they will be removed before going to operating room. For your convenience, we will provide you with a container. If you wear contact lenses or glasses, they will be removed, please bring a case for them. If you have a living will and a durable power of  for healthcare, please bring in a copy. As part of our patient safety program to minimize surgical site infections, we ask you to do the following:    · Please notify your surgeon if you develop any illness between         now and the  day of your surgery.     · This includes a cough, cold, fever, sore throat, nausea,         or vomiting, and diarrhea, etc.  ·  Please notify your surgeon if you experience dizziness, shortness of breath or blurred vision between now and the time of your surgery. Do not shave your operative site 96 hours prior to surgery. For face and neck surgery, men may use an electric razor 48 hours   prior to surgery. You may shower the night before surgery or the morning of   your surgery with an antibacterial soap. You will need to bring a photo ID and insurance card    [facility] has an onsite pharmacy, would you like to utilize our pharmacy     If you will be staying overnight and use a C-pap machine, please bring   your C-pap to hospital     Our goal is to provide you with excellent care, therefore, visitors will be limited to two(2) in the room at a time so that we may focus on providing this care for you. Please contact pre-admission testing if you have any further questions. [facility] phone number:  [phone] PAT fax number:  [phone]  Please note these are generalized instructions for all surgical cases, you may be provided with more specific instructions according to your surgery.

## 2022-01-21 ENCOUNTER — TELEPHONE (OUTPATIENT)
Dept: ORTHOPEDIC SURGERY | Age: 77
End: 2022-01-21

## 2022-01-21 NOTE — PROGRESS NOTES
Pt is scheduled to get H&P on 1/25 when she returns from [place]. Was instructed by Dr. Joni Jimenez office to get Covid test DOS.

## 2022-01-26 ENCOUNTER — ANESTHESIA EVENT (OUTPATIENT)
Dept: OPERATING ROOM | Age: 77
End: 2022-01-26
Payer: MEDICARE

## 2022-01-27 ENCOUNTER — HOSPITAL ENCOUNTER (OUTPATIENT)
Age: 77
Setting detail: OUTPATIENT SURGERY
Discharge: HOME | End: 2022-01-27
Attending: ORTHOPAEDIC SURGERY | Admitting: ORTHOPAEDIC SURGERY
Payer: MEDICARE

## 2022-01-27 ENCOUNTER — TELEPHONE (OUTPATIENT)
Dept: ORTHOPEDIC SURGERY | Age: 77
End: 2022-01-27

## 2022-01-27 ENCOUNTER — ANESTHESIA (OUTPATIENT)
Dept: OPERATING ROOM | Age: 77
End: 2022-01-27
Payer: MEDICARE

## 2022-01-27 VITALS
HEART RATE: 84 BPM | WEIGHT: 110 LBS | HEIGHT: 61 IN | BODY MASS INDEX: 20.77 KG/M2 | RESPIRATION RATE: 16 BRPM | SYSTOLIC BLOOD PRESSURE: 122 MMHG | DIASTOLIC BLOOD PRESSURE: 70 MMHG | OXYGEN SATURATION: 96 % | TEMPERATURE: 97.7 F

## 2022-01-27 VITALS
SYSTOLIC BLOOD PRESSURE: 82 MMHG | OXYGEN SATURATION: 98 % | DIASTOLIC BLOOD PRESSURE: 47 MMHG | RESPIRATION RATE: 2 BRPM

## 2022-01-27 LAB — SARS-COV-2, NAAT: NOT DETECTED

## 2022-01-27 PROCEDURE — 87635 SARS-COV-2 COVID-19 AMP PRB: CPT

## 2022-01-27 PROCEDURE — 7100000010 HC PHASE II RECOVERY - FIRST 15 MIN: Performed by: ORTHOPAEDIC SURGERY

## 2022-01-27 PROCEDURE — 7100000011 HC PHASE II RECOVERY - ADDTL 15 MIN: Performed by: ORTHOPAEDIC SURGERY

## 2022-01-27 PROCEDURE — 7100000000 HC PACU RECOVERY - FIRST 15 MIN: Performed by: ORTHOPAEDIC SURGERY

## 2022-01-27 PROCEDURE — 6360000002 HC RX W HCPCS: Performed by: ANESTHESIOLOGY

## 2022-01-27 PROCEDURE — 3600000015 HC SURGERY LEVEL 5 ADDTL 15MIN: Performed by: ORTHOPAEDIC SURGERY

## 2022-01-27 PROCEDURE — 3700000000 HC ANESTHESIA ATTENDED CARE: Performed by: ORTHOPAEDIC SURGERY

## 2022-01-27 PROCEDURE — 6360000002 HC RX W HCPCS

## 2022-01-27 PROCEDURE — 2580000003 HC RX 258: Performed by: ORTHOPAEDIC SURGERY

## 2022-01-27 PROCEDURE — 64718 REVISE ULNAR NERVE AT ELBOW: CPT | Performed by: ORTHOPAEDIC SURGERY

## 2022-01-27 PROCEDURE — 2500000003 HC RX 250 WO HCPCS: Performed by: ORTHOPAEDIC SURGERY

## 2022-01-27 PROCEDURE — 7100000001 HC PACU RECOVERY - ADDTL 15 MIN: Performed by: ORTHOPAEDIC SURGERY

## 2022-01-27 PROCEDURE — 3600000005 HC SURGERY LEVEL 5 BASE: Performed by: ORTHOPAEDIC SURGERY

## 2022-01-27 PROCEDURE — 2500000003 HC RX 250 WO HCPCS

## 2022-01-27 PROCEDURE — 3700000001 HC ADD 15 MINUTES (ANESTHESIA): Performed by: ORTHOPAEDIC SURGERY

## 2022-01-27 PROCEDURE — 2709999900 HC NON-CHARGEABLE SUPPLY: Performed by: ORTHOPAEDIC SURGERY

## 2022-01-27 PROCEDURE — 2580000003 HC RX 258: Performed by: ANESTHESIOLOGY

## 2022-01-27 PROCEDURE — A4217 STERILE WATER/SALINE, 500 ML: HCPCS | Performed by: ORTHOPAEDIC SURGERY

## 2022-01-27 RX ORDER — APREPITANT 40 MG/1
40 CAPSULE ORAL ONCE
Status: COMPLETED | OUTPATIENT
Start: 2022-01-27 | End: 2022-01-27

## 2022-01-27 RX ORDER — DEXAMETHASONE SODIUM PHOSPHATE 4 MG/ML
INJECTION, SOLUTION INTRA-ARTICULAR; INTRALESIONAL; INTRAMUSCULAR; INTRAVENOUS; SOFT TISSUE PRN
Status: DISCONTINUED | OUTPATIENT
Start: 2022-01-27 | End: 2022-01-27 | Stop reason: SDUPTHER

## 2022-01-27 RX ORDER — SODIUM CHLORIDE 9 MG/ML
25 INJECTION, SOLUTION INTRAVENOUS PRN
Status: DISCONTINUED | OUTPATIENT
Start: 2022-01-27 | End: 2022-01-27 | Stop reason: HOSPADM

## 2022-01-27 RX ORDER — ONDANSETRON 2 MG/ML
INJECTION INTRAMUSCULAR; INTRAVENOUS PRN
Status: DISCONTINUED | OUTPATIENT
Start: 2022-01-27 | End: 2022-01-27 | Stop reason: SDUPTHER

## 2022-01-27 RX ORDER — PROPOFOL 10 MG/ML
INJECTION, EMULSION INTRAVENOUS PRN
Status: DISCONTINUED | OUTPATIENT
Start: 2022-01-27 | End: 2022-01-27 | Stop reason: SDUPTHER

## 2022-01-27 RX ORDER — SODIUM CHLORIDE 9 MG/ML
INJECTION, SOLUTION INTRAVENOUS CONTINUOUS
Status: DISCONTINUED | OUTPATIENT
Start: 2022-01-27 | End: 2022-01-27 | Stop reason: HOSPADM

## 2022-01-27 RX ORDER — FENTANYL CITRATE 50 UG/ML
INJECTION, SOLUTION INTRAMUSCULAR; INTRAVENOUS PRN
Status: DISCONTINUED | OUTPATIENT
Start: 2022-01-27 | End: 2022-01-27 | Stop reason: SDUPTHER

## 2022-01-27 RX ORDER — BUPIVACAINE HYDROCHLORIDE 5 MG/ML
INJECTION, SOLUTION EPIDURAL; INTRACAUDAL
Status: COMPLETED | OUTPATIENT
Start: 2022-01-27 | End: 2022-01-27

## 2022-01-27 RX ORDER — SODIUM CHLORIDE 0.9 % (FLUSH) 0.9 %
10 SYRINGE (ML) INJECTION EVERY 12 HOURS SCHEDULED
Status: DISCONTINUED | OUTPATIENT
Start: 2022-01-27 | End: 2022-01-27 | Stop reason: HOSPADM

## 2022-01-27 RX ORDER — SODIUM CHLORIDE 0.9 % (FLUSH) 0.9 %
10 SYRINGE (ML) INJECTION PRN
Status: DISCONTINUED | OUTPATIENT
Start: 2022-01-27 | End: 2022-01-27 | Stop reason: HOSPADM

## 2022-01-27 RX ORDER — MAGNESIUM HYDROXIDE 1200 MG/15ML
LIQUID ORAL CONTINUOUS PRN
Status: COMPLETED | OUTPATIENT
Start: 2022-01-27 | End: 2022-01-27

## 2022-01-27 RX ORDER — LIDOCAINE HYDROCHLORIDE 20 MG/ML
INJECTION, SOLUTION EPIDURAL; INFILTRATION; INTRACAUDAL; PERINEURAL PRN
Status: DISCONTINUED | OUTPATIENT
Start: 2022-01-27 | End: 2022-01-27 | Stop reason: SDUPTHER

## 2022-01-27 RX ADMIN — ONDANSETRON 4 MG: 2 INJECTION INTRAMUSCULAR; INTRAVENOUS at 09:40

## 2022-01-27 RX ADMIN — PROPOFOL 100 MG: 10 INJECTION, EMULSION INTRAVENOUS at 09:34

## 2022-01-27 RX ADMIN — FENTANYL CITRATE 50 MCG: 50 INJECTION INTRAMUSCULAR; INTRAVENOUS at 09:32

## 2022-01-27 RX ADMIN — DEXAMETHASONE SODIUM PHOSPHATE 8 MG: 4 INJECTION, SOLUTION INTRAMUSCULAR; INTRAVENOUS at 09:40

## 2022-01-27 RX ADMIN — LIDOCAINE HYDROCHLORIDE 50 MG: 20 INJECTION, SOLUTION EPIDURAL; INFILTRATION; INTRACAUDAL; PERINEURAL at 09:34

## 2022-01-27 RX ADMIN — SODIUM CHLORIDE: 9 INJECTION, SOLUTION INTRAVENOUS at 09:32

## 2022-01-27 RX ADMIN — SODIUM CHLORIDE: 9 INJECTION, SOLUTION INTRAVENOUS at 08:18

## 2022-01-27 RX ADMIN — APREPITANT 40 MG: 40 CAPSULE ORAL at 09:08

## 2022-01-27 ASSESSMENT — PULMONARY FUNCTION TESTS
PIF_VALUE: 2
PIF_VALUE: 2
PIF_VALUE: 3
PIF_VALUE: 2
PIF_VALUE: 0
PIF_VALUE: 2
PIF_VALUE: 14
PIF_VALUE: 1
PIF_VALUE: 14
PIF_VALUE: 2
PIF_VALUE: 0
PIF_VALUE: 1
PIF_VALUE: 2
PIF_VALUE: 0
PIF_VALUE: 2
PIF_VALUE: 3
PIF_VALUE: 2
PIF_VALUE: 1

## 2022-01-27 ASSESSMENT — LIFESTYLE VARIABLES: SMOKING_STATUS: 0

## 2022-01-27 ASSESSMENT — PAIN SCALES - GENERAL
PAINLEVEL_OUTOF10: 0

## 2022-01-27 ASSESSMENT — PAIN - FUNCTIONAL ASSESSMENT: PAIN_FUNCTIONAL_ASSESSMENT: 0-10

## 2022-01-27 NOTE — H&P
Pre-operative Update of H&P:    I  have seen & examined Ms. Delatorre Nations related solely to her hand and upper extremity conditions, prior to the scheduled procedure on the date of her surgery. The indications for the planned surgical procedure & and her upper-extremity condition are unchanged.

## 2022-01-27 NOTE — TELEPHONE ENCOUNTER
CPT: 99172  BODY PART: left elbow  STATUS: outpatient  AUTHORIZATION: NPR    Per Alessandra, [facility]

## 2022-01-27 NOTE — PROGRESS NOTES
Pt tolerates PO well. Bends and straightens right fingers as directed. Demonstrated proper elevation right hand and encouraged pt to use pillows if difficult to elevated right arm. Up to chair. VSS. Discharge instructions reviewed with pt and . Both express an understanding of instructions. Pt dressing with 's assistance.

## 2022-01-27 NOTE — ANESTHESIA PRE PROCEDURE
[facility] Department of Anesthesiology  Pre-Anesthesia Evaluation/Consultation       Name:  Ilda Issa  : 1945  Age:  67 y.o. MRN:  [id number]  Date: 2022           Surgeon: Surgeon(s):  Eugene Mendez MD    Procedure: Procedure(s):  RIGHT ULNAR NERVE DECOMPRESSION (AT ELBOW)     Allergies   Allergen Reactions    Acetaminophen-Codeine      TYLENOL #3    Demerol     Meperidine Nausea And Vomiting    Penicillins Rash     Patient Active Problem List   Diagnosis    Tendinitis of right rotator cuff    Biceps tendinitis of right upper extremity    Left knee pain    Primary osteoarthritis of left knee     Past Medical History:   Diagnosis Date    Arthritis     Cataract     Hyperlipidemia     Primary osteoarthritis of left knee 2021     Past Surgical History:   Procedure Laterality Date    CATARACT REMOVAL WITH IMPLANT       Social History     Tobacco Use    Smoking status: Never Smoker    Smokeless tobacco: Never Used   Substance Use Topics    Alcohol use: No    Drug use: No     Medications  No current facility-administered medications on file prior to encounter.      Current Outpatient Medications on File Prior to Encounter   Medication Sig Dispense Refill    atorvastatin (LIPITOR) 10 MG tablet Take 10 mg by mouth daily       Current Facility-Administered Medications   Medication Dose Route Frequency Provider Last Rate Last Admin    0.9 % sodium chloride infusion   IntraVENous Continuous Deisy Glass  mL/hr at 22 0818 New Bag at 22 0818    sodium chloride flush 0.9 % injection 10 mL  10 mL IntraVENous 2 times per day Deisy Glass MD        sodium chloride flush 0.9 % injection 10 mL  10 mL IntraVENous PRN Deisy Glass MD        0.9 % sodium chloride infusion  25 mL IntraVENous PRN Deisy Glass MD         Vital Signs (Current)   Vitals:    22 1722 22 0809   BP:  126/76   Pulse:  87   Resp:  18 Temp:  97.4 °F (36.3 °C)   TempSrc:  Temporal   SpO2:  97%   Weight: 110 lb (49.9 kg)    Height: 5' 1\" (1.549 m)                                             Vital Signs Statistics (for past 48 hrs)     Temp  Av.4 °F (36.3 °C)  Min: 97.4 °F (36.3 °C)   Min taken time: 22 0809  Max: 97.4 °F (36.3 °C)   Max taken time: 22 0809  Pulse  Av  Min: 87   Min taken time: 22 0809  Max: 80   Max taken time: 22 0809  Resp  Av  Min: 18   Min taken time: 22 0809  Max: 25   Max taken time: 22 0809  BP  Min: 126/76   Min taken time: 22 0809  Max: 126/76   Max taken time: 22 0809  SpO2  Av %  Min: 97 %   Min taken time: 22 0809  Max: 97 %   Max taken time: 22 0809  BP Readings from Last 3 Encounters:   22 126/76   17 111/66   / 117/62       BMI  Body mass index is 20.78 kg/m². Estimated body mass index is 20.78 kg/m² as calculated from the following:    Height as of this encounter: 5' 1\" (1.549 m). Weight as of this encounter: 110 lb (49.9 kg). CBC No results found for: WBC, RBC, HGB, HCT, MCV, RDW, PLT  CMP  No results found for: NA, K, CL, CO2, BUN, CREATININE, GFRAA, AGRATIO, LABGLOM, GLUCOSE, GLU, PROT, CALCIUM, BILITOT, ALKPHOS, AST, ALT  BMP  No results found for: NA, K, CL, CO2, BUN, CREATININE, CALCIUM, GFRAA, LABGLOM, GLUCOSE, GLU  POCGlucose  No results for input(s): GLUCOSE in the last 72 hours.    Coags  No results found for: PROTIME, INR, APTT  HCG (If Applicable) No results found for: PREGTESTUR, PREGSERUM, HCG, HCGQUANT   ABGs No results found for: PHART, PO2ART, YDH1ICG, HDG7JDG, BEART, I4NUKIUW   Type & Screen (If Applicable)  No results found for: LABABO, LABRH                         BMI: Wt Readings from Last 3 Encounters:       NPO Status:   Date of last liquid consumption: 22   Time of last liquid consumption: 2300   Date of last solid food consumption: 22      Time of last solid consumption: 2300       Anesthesia Evaluation  Patient summary reviewed no history of anesthetic complications:   Airway: Mallampati: II  TM distance: >3 FB   Neck ROM: full  Mouth opening: > = 3 FB Dental: normal exam         Pulmonary: breath sounds clear to auscultation      (-) COPD, asthma, sleep apnea and not a current smoker                           Cardiovascular:  Exercise tolerance: good (>4 METS),   (+) hyperlipidemia    (-) hypertension, past MI, CABG/stent and  angina        Rate: normal                    Neuro/Psych:      (-) seizures, TIA and CVA           GI/Hepatic/Renal:        (-) GERD, hepatitis and liver disease       Endo/Other:        (-) diabetes mellitus, hypothyroidism               Abdominal:             Vascular:     - DVT and PE. Other Findings:             Anesthesia Plan      general     ASA 2       Induction: intravenous. MIPS: Postoperative opioids intended and Prophylactic antiemetics administered. Anesthetic plan and risks discussed with patient. Plan discussed with CRNA. This pre-anesthesia assessment may be used as a history and physical.    DOS STAFF ADDENDUM:    Pt seen and examined, chart reviewed (including anesthesia, drug and allergy history). No interval changes to history and physical examination. Anesthetic plan, risks, benefits, alternatives, and personnel involved discussed with patient. Patient verbalized an understanding and agrees to proceed.       Torri Ji MD  January 27, 2022  8:50 AM

## 2022-01-27 NOTE — PROGRESS NOTES
Pt alert on arrival to phase II. VSS. Pt denies pain at present. Elevated right arm and hand. Given cookies and diet soda.  to room. Call light within reach.

## 2022-01-27 NOTE — OP NOTE
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 Kimmy Hendrickson MD    Surgical Assistant:    Jose Ventura PA-C    Anesthesia:  General          Blood Loss:  Minimal    Complications:  None       Tourniquet Time: 3 minutes. Indications:  Ms. Teresa Schwartz  is a 67y.o. year old female with entrapment of her Right ulnar nerve at the elbow. I have discussed preoperatively with Ms. Teresa Schwartz  the complications, limitations, expectations, alternatives and risks of surgical care, which she understood. Ms. Teresa Schwartz  has provided written informed consent to proceed. After written consent was obtained and the proper operative site identified and marked, Ms. Teresa Schwartz was brought to the operating room and placed in the supine position on the operating room table. The Right arm was extended on a hand table & the Right upper extremity was prepped and draped in the usual sterile fashion. After Esmarch exsanguination the pneumo-tourniquet was inflated about the upper arm to 250 millimeters of mercury. A curvilinear incision was fashioned over the posterior medial aspect of the elbow centered between the medial epicondyle and the tip of the olecranon. Dissection was carried carefully through the subcutaneous tissue identifying and protecting the superficial neurovascular structures. The cubital tunnel was identified and cleared of overlying soft tissue. Careful incision allowed exposure of the ulnar nerve. The nerve was traced proximally and circumferential control of the nerve was obtained.  It was dissected proximally to the level of the connection between the biceps and triceps muscles, approximately 3 cm proximal to the medial epicondyle. It was carefully mobilized from the medial intermuscular septum. It was traced distally, being completely freed along the course of the cubital tunnel, and was dissected distally to the level of the second motor branch as it split the heads of the FCU muscle. There was a tight fibrous band at the confluence of the heads of the FCU which was carefully divided. Digital palpation revealed that there were no further proximal or distal constriction about the nerve. The Ulnar Nerve was found to be stable in it's groove without tendency toward subluxation or snapping. The wound at this point was irrigated copiously with sterile saline for irrigation and the pneumo-tourniquet deflated after a period of 3  minutes of elevation. The fingers were immediately pink and well perfused. Hemostasis was easily obtained with direct pressure and bipolar cautery. The subcutaneous tissue was closed with interrupted sutures. The skin was closed with interrupted absorbable sutures and local ansethetic was instilled for postoperative analgesia. The wound was dressed with Adaptic dry sterile dressings and a bulky long arm Richardson type dressing was applied. The patient was awakened from anesthesia, having tolerated the procedure without apparent complication, and was returned to the recovery room in stable condition. At the conclusion of the procedure all needle, instrument and sponge counts were correct. Arvidn Geller MD   1/27/2022 , 9:49 AM

## 2022-01-27 NOTE — PROGRESS NOTES
Admitted to PACU 12 from OR, pt very drowsy, hypotensive with BP 79/43, IVF bolus infusing, HOB lowered. Report recd from anesthesia.

## 2022-01-31 ENCOUNTER — TELEPHONE (OUTPATIENT)
Dept: ORTHOPEDIC SURGERY | Age: 77
End: 2022-01-31

## 2022-01-31 NOTE — TELEPHONE ENCOUNTER
Surgery and/or Procedure Scheduling     Contact Name: Charisse Glynn  Surgical/Procedure Request: sx on 1/27/22 on elbow  Patient Contact Number: [phone]    Patient call today and stated she have swelling around the elbow were the bandages are she is just concern and need a call back. Please Advise.

## 2022-01-31 NOTE — TELEPHONE ENCOUNTER
Spoke with the patient she is having swelling cubital tunnel surgery 1/27/22. I told her that this is not unusual after surgery I told her to make sure that she keeps her arm elevated as much as possible and take ibuprofen to help with the swelling as well.

## 2022-02-02 ENCOUNTER — OFFICE VISIT (OUTPATIENT)
Dept: ORTHOPEDIC SURGERY | Age: 77
End: 2022-02-02

## 2022-02-02 ENCOUNTER — TELEPHONE (OUTPATIENT)
Dept: ORTHOPEDIC SURGERY | Age: 77
End: 2022-02-02

## 2022-02-02 VITALS — BODY MASS INDEX: 20.77 KG/M2 | RESPIRATION RATE: 16 BRPM | HEIGHT: 61 IN | WEIGHT: 110 LBS

## 2022-02-02 DIAGNOSIS — G56.22 CUBITAL TUNNEL SYNDROME ON LEFT: Primary | ICD-10-CM

## 2022-02-02 DIAGNOSIS — G56.21 CUBITAL TUNNEL SYNDROME ON RIGHT: ICD-10-CM

## 2022-02-02 PROCEDURE — 99024 POSTOP FOLLOW-UP VISIT: CPT | Performed by: ORTHOPAEDIC SURGERY

## 2022-02-02 NOTE — TELEPHONE ENCOUNTER
Appointment Request     Patient requesting earlier appointment: Yes  Appointment offered to patient: PATIENT WOULD LIKE TO BE SEEN TODAY IF POSSIBLE TO AVOID THE STORM COMING.  PLEASE CALL TO ADVISE  Patient Contact Number: [phone]

## 2022-02-02 NOTE — TELEPHONE ENCOUNTER
Made patient an appt to come in at 1 at the [facility] office to see Joao Cobian.

## 2022-02-02 NOTE — PATIENT INSTRUCTIONS
Postoperative Instructions After Carpal Tunnel Release    Dr. Myrtle Carvalho        1. After bandages are removed one week from surgery, you may chose to wear a small bandage over the incision if you wish, though you do not need to. 2. Keep incision dry until sutures have fully dissolved  or it has been 12 - 14 days since your surgery. Thereafter, you may wash with mild soap and water and shower normally. 3. Work hard on motion of the fingers and wrist, straightening each finger fully and bending each finger fully, bending wrist forward and bending wrist backwards. Do not be concerned if you experience discomfort. This will not damage the surgery. 4. You may begin using the hand as it feels comfortable beginning 12 - 14 days from the day of surgery. You may not feel entirely comfortable gripping or lifting heavy objects for several weeks. 5. You may expect to see some skin peel off around the incision. You may be left with a small area of pink baby skin. This is quite normal.  6. Once your stiches have fully disappeared & skin appears normal, you should begin gently massaging the incision with Vitamin E (may use Vitamin E lotion or contents of Vitamin E capsule). Thank you for choosing [facility] Physicians for your Hand and Upper Extremity needs. If we can be of any further assistance to you, please do not hesitate to contact us.     Office Phone Number:  [phone]  or  [phone]

## 2022-02-02 NOTE — PROGRESS NOTES
Ms. Agustín Morrissey returns today in follow-up of her recent right Ulnar Nerve Decompression (Cubital Tunnel Release) done approximately 1 week ago. She has done well noting mild discomfort and no other reported complications. She notes pre-operative symptoms to be completely resolved at this time. Physical Exam:  Skin incision is healing well, without erythema, drainage or sign of infection. Digital range of motion is Full. Wrist range of motion is Full. Elbow range of motion remains Full and limited by effort. Sensation is completely resolved in the Ulnar Innervated Digits. Vascular examination reveals normal, good capillary refill and good color. Swelling is minimal.  Sensory and Motor Ulnar Nerve function is intact. Impression:  Ms. Agustín Morrissey is doing well after recent right Ulnar Nerve Decompression (Cubital Tunnel Release). Elbow range of motion is passively full but actively limited by effort/pain. Plan:  Ms. Agustín Morrissey is instructed in work on Active & Passive range of motion of the digits, wrist, & elbow. These modalities were specifically demonstrated to her today. We discussed the appropriateness of gradual resumption of use of the operated hand and the return to normal use as comfort allows. She is given instructions regarding management of the fresh surgical incision and progressive use of desensitization and tissue massage techniques. We discussed the appropriate expectations and timeline for symptom improvement. She is provided a written patient instruction sheet titled: Postoperative Instructions After Ulnar Nerve Decompression. I have asked Ms. Agustín Morrissey to follow-up with me or contact me by telephone over the next 2-4 weeks if her symptoms have not fully resolved or if she has not regained full & painless return of function.        She is also specifically instructed to return to the office or call for an appointment sooner if her symptoms are

## 2022-02-02 NOTE — PROGRESS NOTES
C-Difficile admission screening and protocol:     * Admitted with diarrhea? YES____    NO__X_     *Prior history of C-Diff. In last 3 months? YES____   NO__X__     *Antibiotic use in the past 6-8 weeks? NO___X___YES______                 If yes which  ANTIBIOTIC AND REASON______     *Prior hospitalization or nursing home in the last month?  YES____   NO__X_
Covid testing to be done DOS  If positive---Pt instructed to notify MD ASAP   If negative--pt was instructed to bring results DOP/DOS
toes      For your safety, please do not wear any jewelry or body piercing's on the day of surgery. All jewelry must be removed. If you have dentures, they will be removed before going to operating room. For your convenience, we will provide you with a container. If you wear contact lenses or glasses, they will be removed, please bring a case for them. If you have a living will and a durable power of  for healthcare, please bring in a copy. As part of our patient safety program to minimize surgical site infections, we ask you to do the following:    · Please notify your surgeon if you develop any illness between         now and the  day of your surgery. · This includes a cough, cold, fever, sore throat, nausea,         or vomiting, and diarrhea, etc.  ·  Please notify your surgeon if you experience dizziness, shortness         of breath or blurred vision between now and the time of your surgery. Do not shave your operative site 96 hours prior to surgery. For face and neck surgery, men may use an electric razor 48 hours   prior to surgery. You may shower the night before surgery or the morning of   your surgery with an antibacterial soap. You will need to bring a photo ID and insurance card    [facility] has an onsite pharmacy, would you like to utilize our pharmacy     If you will be staying overnight and use a C-pap machine, please bring   your C-pap to hospital     Our goal is to provide you with excellent care, therefore, visitors will be limited to two(2) in the room at a time so that we may focus on providing this care for you. Please contact pre-admission testing if you have any further questions. [facility] phone number:  [address] [facility] fax number:  [phone]  Please note these are generalized instructions for all surgical cases, you may be provided with more specific instructions according to your surgery.

## 2022-02-10 ENCOUNTER — HOSPITAL ENCOUNTER (OUTPATIENT)
Age: 77
Setting detail: OUTPATIENT SURGERY
Discharge: HOME | End: 2022-02-10
Attending: ORTHOPAEDIC SURGERY | Admitting: ORTHOPAEDIC SURGERY
Payer: MEDICARE

## 2022-02-10 VITALS — WEIGHT: 111.33 LBS | BODY MASS INDEX: 21.86 KG/M2 | HEIGHT: 60 IN

## 2022-02-10 DIAGNOSIS — G56.22 CUBITAL TUNNEL SYNDROME ON LEFT: Primary | ICD-10-CM

## 2022-02-10 LAB — SARS-COV-2, NAAT: NOT DETECTED

## 2022-02-10 PROCEDURE — 87635 SARS-COV-2 COVID-19 AMP PRB: CPT

## 2022-02-10 RX ORDER — SODIUM CHLORIDE 9 MG/ML
25 INJECTION, SOLUTION INTRAVENOUS PRN
Status: DISCONTINUED | OUTPATIENT
Start: 2022-02-10 | End: 2022-02-10 | Stop reason: HOSPADM

## 2022-02-10 RX ORDER — SODIUM CHLORIDE 9 MG/ML
INJECTION, SOLUTION INTRAVENOUS CONTINUOUS
Status: DISCONTINUED | OUTPATIENT
Start: 2022-02-10 | End: 2022-02-10 | Stop reason: HOSPADM

## 2022-02-10 RX ORDER — SODIUM CHLORIDE 0.9 % (FLUSH) 0.9 %
10 SYRINGE (ML) INJECTION PRN
Status: DISCONTINUED | OUTPATIENT
Start: 2022-02-10 | End: 2022-02-10 | Stop reason: HOSPADM

## 2022-02-10 RX ORDER — SODIUM CHLORIDE 0.9 % (FLUSH) 0.9 %
10 SYRINGE (ML) INJECTION EVERY 12 HOURS SCHEDULED
Status: DISCONTINUED | OUTPATIENT
Start: 2022-02-10 | End: 2022-02-10 | Stop reason: HOSPADM

## 2022-02-10 ASSESSMENT — PAIN - FUNCTIONAL ASSESSMENT: PAIN_FUNCTIONAL_ASSESSMENT: 0-10

## 2022-03-08 DIAGNOSIS — M17.12 PRIMARY OSTEOARTHRITIS OF LEFT KNEE: Primary | ICD-10-CM

## 2022-03-08 RX ORDER — MELOXICAM 15 MG/1
15 TABLET ORAL DAILY
Qty: 30 TABLET | Refills: 0 | Status: SHIPPED | OUTPATIENT
Start: 2022-03-08 | End: 2022-04-05

## 2022-03-08 NOTE — TELEPHONE ENCOUNTER
Prescription Refill     Medication Name:  MELOXICAM  Pharmacy: [facility]  Patient Contact Number:  [phone]

## 2022-03-23 RX ORDER — MELOXICAM 15 MG/1
15 TABLET ORAL DAILY
COMMUNITY

## 2022-03-23 RX ORDER — ATORVASTATIN CALCIUM 10 MG/1
10 TABLET, FILM COATED ORAL DAILY
COMMUNITY

## 2022-03-23 RX ORDER — OMEPRAZOLE 20 MG/1
20 CAPSULE, DELAYED RELEASE ORAL DAILY
COMMUNITY

## 2022-03-23 NOTE — PROGRESS NOTES
Covid testing to be done: If positive---Pt instructed to notify MD ASAP  If negative--pt was instructed to bring Hard copy of Covid results DOP/DOS    Preoperative Screening for Elective Surgery/Invasive Procedures While COVID-19 present in the community     1. Have you tested positive or have been told to self-isolate for COVID-19 like symptoms within the past 28 days? no  2. Do you currently have any of the following symptoms? no  ? Fever >100.0 F or 99.9 F in immunocompromised patients? no  ? New onset cough, shortness of breath or difficulty breathing? no  ? New onset sore throat, myalgia (muscle aches and pains), headache, loss of taste/smell or diarrhea? no  3. Have you had a potential exposure to COVID-19 within the past 14 days by:  ? Close contact with a confirmed case? no  ? Close contact with a healthcare worker,  or essential infrastructure worker (grocery store, restaurant, gas station, public utilities or transportation)? no  ? Do you reside in a congregate setting such as; skilled nursing facility, adult home, correctional facility, homeless shelter or other institutional setting? no  ? Have you had recent travel to a known COVID-19 hotspot? no     Indicate if the patient has a positive screen by answering yes to one or more of the above questions. If patient is unable to obtain a COVID test prior to DOS/DOP will need RAPID DOS. C-Difficile admission screening and protocol:       * Admitted with diarrhea? [] YES    [x]  NO     *Prior history of C-Diff. In last 3 months? [] YES    [x]  NO     *Antibiotic use in the past 6-8 weeks? [x]  NO    []  YES      If yes, which: REASON_________________     *Prior hospitalization or nursing home in the last month? []  YES    [x]  NO     SAFETY FIRST. .call before you fall    [address] [facility] time_0800___________        Surgery time__0630__________    Do not eat or drink with a container. If you wear contact lenses or glasses, they will be removed, please bring a case for them. If you have a living will and a durable power of  for healthcare, please bring in a copy. As part of our patient safety program to minimize surgical site infections, we ask you to do the following:    · Please notify your surgeon if you develop any illness between         now and the day of your surgery. · This includes a cough, cold, fever, sore throat, nausea,         or vomiting, and diarrhea, etc.  ·  Please notify your surgeon if you experience dizziness, shortness         of breath or blurred vision between now and the time of your surgery. Do not shave your operative site 96 hours prior to surgery. For face and neck surgery, men may use an electric razor 48 hours   prior to surgery. You may shower the night before surgery or the morning of   your surgery with an antibacterial soap. You will need to bring a photo ID and insurance card     If you use a C-pap or Bi-pap machine, please bring your machine with you to the hospital     Our goal is to provide you with excellent care, therefore, visitors will be limited to so that we may focus on providing this care for you. Please contact your surgeon office, if you have any further questions. [facility] phone number:  [address] [facility] fax number:  [phone]    Please note these are generalized instructions for all surgical cases, you may be provided with more specific instructions according to your surgery. Call Before you Fall. ................... Juan Rudolph

## 2022-03-31 DIAGNOSIS — M17.12 PRIMARY OSTEOARTHRITIS OF LEFT KNEE: ICD-10-CM

## 2022-04-05 RX ORDER — MELOXICAM 15 MG/1
TABLET ORAL
Qty: 30 TABLET | Refills: 0 | Status: SHIPPED | OUTPATIENT
Start: 2022-04-05

## 2022-04-06 ENCOUNTER — ANESTHESIA EVENT (OUTPATIENT)
Dept: OPERATING ROOM | Age: 77
End: 2022-04-06
Payer: MEDICARE

## 2022-04-07 ENCOUNTER — ANESTHESIA (OUTPATIENT)
Dept: OPERATING ROOM | Age: 77
End: 2022-04-07
Payer: MEDICARE

## 2022-04-07 ENCOUNTER — HOSPITAL ENCOUNTER (OUTPATIENT)
Age: 77
Setting detail: OUTPATIENT SURGERY
Discharge: HOME | End: 2022-04-07
Attending: ORTHOPAEDIC SURGERY | Admitting: ORTHOPAEDIC SURGERY
Payer: MEDICARE

## 2022-04-07 VITALS
OXYGEN SATURATION: 98 % | DIASTOLIC BLOOD PRESSURE: 46 MMHG | SYSTOLIC BLOOD PRESSURE: 79 MMHG | TEMPERATURE: 98.6 F | RESPIRATION RATE: 11 BRPM

## 2022-04-07 VITALS
TEMPERATURE: 97.4 F | OXYGEN SATURATION: 99 % | SYSTOLIC BLOOD PRESSURE: 110 MMHG | HEIGHT: 61 IN | RESPIRATION RATE: 12 BRPM | DIASTOLIC BLOOD PRESSURE: 60 MMHG | HEART RATE: 80 BPM | WEIGHT: 113.76 LBS | BODY MASS INDEX: 21.48 KG/M2

## 2022-04-07 PROCEDURE — 3700000000 HC ANESTHESIA ATTENDED CARE: Performed by: ORTHOPAEDIC SURGERY

## 2022-04-07 PROCEDURE — 7100000011 HC PHASE II RECOVERY - ADDTL 15 MIN: Performed by: ORTHOPAEDIC SURGERY

## 2022-04-07 PROCEDURE — 7100000001 HC PACU RECOVERY - ADDTL 15 MIN: Performed by: ORTHOPAEDIC SURGERY

## 2022-04-07 PROCEDURE — 3600000005 HC SURGERY LEVEL 5 BASE: Performed by: ORTHOPAEDIC SURGERY

## 2022-04-07 PROCEDURE — 7100000000 HC PACU RECOVERY - FIRST 15 MIN: Performed by: ORTHOPAEDIC SURGERY

## 2022-04-07 PROCEDURE — 2709999900 HC NON-CHARGEABLE SUPPLY: Performed by: ORTHOPAEDIC SURGERY

## 2022-04-07 PROCEDURE — 2500000003 HC RX 250 WO HCPCS

## 2022-04-07 PROCEDURE — 64718 REVISE ULNAR NERVE AT ELBOW: CPT | Performed by: ORTHOPAEDIC SURGERY

## 2022-04-07 PROCEDURE — 3700000001 HC ADD 15 MINUTES (ANESTHESIA): Performed by: ORTHOPAEDIC SURGERY

## 2022-04-07 PROCEDURE — A4217 STERILE WATER/SALINE, 500 ML: HCPCS | Performed by: ORTHOPAEDIC SURGERY

## 2022-04-07 PROCEDURE — 7100000010 HC PHASE II RECOVERY - FIRST 15 MIN: Performed by: ORTHOPAEDIC SURGERY

## 2022-04-07 PROCEDURE — 6360000002 HC RX W HCPCS

## 2022-04-07 PROCEDURE — 2580000003 HC RX 258: Performed by: ANESTHESIOLOGY

## 2022-04-07 PROCEDURE — 2500000003 HC RX 250 WO HCPCS: Performed by: ORTHOPAEDIC SURGERY

## 2022-04-07 PROCEDURE — 2580000003 HC RX 258: Performed by: ORTHOPAEDIC SURGERY

## 2022-04-07 PROCEDURE — 3600000015 HC SURGERY LEVEL 5 ADDTL 15MIN: Performed by: ORTHOPAEDIC SURGERY

## 2022-04-07 RX ORDER — SODIUM CHLORIDE 9 MG/ML
INJECTION, SOLUTION INTRAVENOUS CONTINUOUS
Status: DISCONTINUED | OUTPATIENT
Start: 2022-04-07 | End: 2022-04-07 | Stop reason: HOSPADM

## 2022-04-07 RX ORDER — DEXAMETHASONE SODIUM PHOSPHATE 4 MG/ML
INJECTION, SOLUTION INTRA-ARTICULAR; INTRALESIONAL; INTRAMUSCULAR; INTRAVENOUS; SOFT TISSUE PRN
Status: DISCONTINUED | OUTPATIENT
Start: 2022-04-07 | End: 2022-04-07 | Stop reason: SDUPTHER

## 2022-04-07 RX ORDER — SODIUM CHLORIDE 0.9 % (FLUSH) 0.9 %
10 SYRINGE (ML) INJECTION PRN
Status: DISCONTINUED | OUTPATIENT
Start: 2022-04-07 | End: 2022-04-07 | Stop reason: HOSPADM

## 2022-04-07 RX ORDER — SODIUM CHLORIDE 0.9 % (FLUSH) 0.9 %
10 SYRINGE (ML) INJECTION EVERY 12 HOURS SCHEDULED
Status: DISCONTINUED | OUTPATIENT
Start: 2022-04-07 | End: 2022-04-07 | Stop reason: HOSPADM

## 2022-04-07 RX ORDER — SODIUM CHLORIDE 0.9 % (FLUSH) 0.9 %
5-40 SYRINGE (ML) INJECTION EVERY 12 HOURS SCHEDULED
Status: DISCONTINUED | OUTPATIENT
Start: 2022-04-07 | End: 2022-04-07 | Stop reason: HOSPADM

## 2022-04-07 RX ORDER — ONDANSETRON 2 MG/ML
4 INJECTION INTRAMUSCULAR; INTRAVENOUS
Status: DISCONTINUED | OUTPATIENT
Start: 2022-04-07 | End: 2022-04-07 | Stop reason: HOSPADM

## 2022-04-07 RX ORDER — MEPERIDINE HYDROCHLORIDE 25 MG/ML
12.5 INJECTION INTRAMUSCULAR; INTRAVENOUS; SUBCUTANEOUS EVERY 5 MIN PRN
Status: DISCONTINUED | OUTPATIENT
Start: 2022-04-07 | End: 2022-04-07 | Stop reason: HOSPADM

## 2022-04-07 RX ORDER — SODIUM CHLORIDE 0.9 % (FLUSH) 0.9 %
5-40 SYRINGE (ML) INJECTION PRN
Status: DISCONTINUED | OUTPATIENT
Start: 2022-04-07 | End: 2022-04-07 | Stop reason: HOSPADM

## 2022-04-07 RX ORDER — PHENYLEPHRINE HCL IN 0.9% NACL 1 MG/10 ML
SYRINGE (ML) INTRAVENOUS PRN
Status: DISCONTINUED | OUTPATIENT
Start: 2022-04-07 | End: 2022-04-07 | Stop reason: SDUPTHER

## 2022-04-07 RX ORDER — MAGNESIUM HYDROXIDE 1200 MG/15ML
LIQUID ORAL CONTINUOUS PRN
Status: COMPLETED | OUTPATIENT
Start: 2022-04-07 | End: 2022-04-07

## 2022-04-07 RX ORDER — BUPIVACAINE HYDROCHLORIDE 5 MG/ML
INJECTION, SOLUTION EPIDURAL; INTRACAUDAL
Status: COMPLETED | OUTPATIENT
Start: 2022-04-07 | End: 2022-04-07

## 2022-04-07 RX ORDER — FENTANYL CITRATE 50 UG/ML
INJECTION, SOLUTION INTRAMUSCULAR; INTRAVENOUS PRN
Status: DISCONTINUED | OUTPATIENT
Start: 2022-04-07 | End: 2022-04-07 | Stop reason: SDUPTHER

## 2022-04-07 RX ORDER — PROPOFOL 10 MG/ML
INJECTION, EMULSION INTRAVENOUS PRN
Status: DISCONTINUED | OUTPATIENT
Start: 2022-04-07 | End: 2022-04-07 | Stop reason: SDUPTHER

## 2022-04-07 RX ORDER — SODIUM CHLORIDE 9 MG/ML
INJECTION, SOLUTION INTRAVENOUS PRN
Status: DISCONTINUED | OUTPATIENT
Start: 2022-04-07 | End: 2022-04-07 | Stop reason: HOSPADM

## 2022-04-07 RX ORDER — SODIUM CHLORIDE 9 MG/ML
25 INJECTION, SOLUTION INTRAVENOUS PRN
Status: DISCONTINUED | OUTPATIENT
Start: 2022-04-07 | End: 2022-04-07 | Stop reason: HOSPADM

## 2022-04-07 RX ORDER — ONDANSETRON 2 MG/ML
INJECTION INTRAMUSCULAR; INTRAVENOUS PRN
Status: DISCONTINUED | OUTPATIENT
Start: 2022-04-07 | End: 2022-04-07 | Stop reason: SDUPTHER

## 2022-04-07 RX ORDER — LIDOCAINE HYDROCHLORIDE 20 MG/ML
INJECTION, SOLUTION EPIDURAL; INFILTRATION; INTRACAUDAL; PERINEURAL PRN
Status: DISCONTINUED | OUTPATIENT
Start: 2022-04-07 | End: 2022-04-07 | Stop reason: SDUPTHER

## 2022-04-07 RX ADMIN — DEXAMETHASONE SODIUM PHOSPHATE 4 MG: 4 INJECTION, SOLUTION INTRAMUSCULAR; INTRAVENOUS at 07:53

## 2022-04-07 RX ADMIN — PROPOFOL 120 MG: 10 INJECTION, EMULSION INTRAVENOUS at 07:50

## 2022-04-07 RX ADMIN — LIDOCAINE HYDROCHLORIDE 50 MG: 20 INJECTION, SOLUTION EPIDURAL; INFILTRATION; INTRACAUDAL; PERINEURAL at 07:50

## 2022-04-07 RX ADMIN — Medication 100 MCG: at 07:55

## 2022-04-07 RX ADMIN — FENTANYL CITRATE 50 MCG: 50 INJECTION INTRAMUSCULAR; INTRAVENOUS at 07:49

## 2022-04-07 RX ADMIN — SODIUM CHLORIDE: 9 INJECTION, SOLUTION INTRAVENOUS at 07:38

## 2022-04-07 RX ADMIN — Medication 100 MCG: at 08:03

## 2022-04-07 RX ADMIN — ONDANSETRON 4 MG: 2 INJECTION INTRAMUSCULAR; INTRAVENOUS at 07:53

## 2022-04-07 RX ADMIN — Medication 100 MCG: at 07:57

## 2022-04-07 ASSESSMENT — PULMONARY FUNCTION TESTS
PIF_VALUE: 3
PIF_VALUE: 8
PIF_VALUE: 18
PIF_VALUE: 2
PIF_VALUE: 8
PIF_VALUE: 1
PIF_VALUE: 8
PIF_VALUE: 8
PIF_VALUE: 1
PIF_VALUE: 2
PIF_VALUE: 8
PIF_VALUE: 0
PIF_VALUE: 3
PIF_VALUE: 8
PIF_VALUE: 3
PIF_VALUE: 0

## 2022-04-07 ASSESSMENT — PAIN - FUNCTIONAL ASSESSMENT: PAIN_FUNCTIONAL_ASSESSMENT: 0-10

## 2022-04-07 ASSESSMENT — PAIN SCALES - GENERAL
PAINLEVEL_OUTOF10: 0

## 2022-04-07 ASSESSMENT — LIFESTYLE VARIABLES: SMOKING_STATUS: 0

## 2022-04-07 NOTE — OP NOTE
OPERATIVE REPORT          Patient:  Doris Lopez    YOB: 1945  Date of Service:  4/7/2022    Location:  [address] OR      Preoperative Diagnosis:   Left Ulnar Nerve entrapment at the Cubital Tunnel    Postoperative Diagnosis:  Same    Procedure:   Left Ulnar Nerve decompression & Cubital Tunnel Release    Surgeon:    Shelly Hunt MD    Surgical Assistant:    Lucinda Kraus PA-C    Anesthesia:  General          Blood Loss:  Minimal    Complications:  None       Tourniquet Time: 3 minutes. Indications:  Ms. Doris Lopez  is a 67y.o. year old female with entrapment of her Left ulnar nerve at the elbow. I have discussed preoperatively with Ms. Doris Lopez  the complications, limitations, expectations, alternatives and risks of surgical care, which she understood. Ms. Doris Lopez  has provided written informed consent to proceed. After written consent was obtained and the proper operative site identified and marked, Ms. Doris Lopez was brought to the operating room and placed in the supine position on the operating room table. The Left arm was extended on a hand table & the Left upper extremity was prepped and draped in the usual sterile fashion. After Esmarch exsanguination the pneumo-tourniquet was inflated about the upper arm to 250 millimeters of mercury. A curvilinear incision was fashioned over the posterior medial aspect of the elbow centered between the medial epicondyle and the tip of the olecranon. Dissection was carried carefully through the subcutaneous tissue identifying and protecting the superficial neurovascular structures. The cubital tunnel was identified and cleared of overlying soft tissue. Careful incision allowed exposure of the ulnar nerve. The nerve was traced proximally and circumferential control of the nerve was obtained.  It was dissected proximally to the level of the connection between the biceps and triceps muscles, approximately 3 cm proximal to the medial epicondyle. It was carefully mobilized from the medial intermuscular septum. It was traced distally, being completely freed along the course of the cubital tunnel, and was dissected distally to the level of the second motor branch as it split the heads of the FCU muscle. There was a tight fibrous band at the confluence of the heads of the FCU which was carefully divided. Digital palpation revealed that there were no further proximal or distal constriction about the nerve. The Ulnar Nerve was found to be stable in it's groove without tendency toward subluxation or snapping. The wound at this point was irrigated copiously with sterile saline for irrigation and the pneumo-tourniquet deflated after a period of 3  minutes of elevation. The fingers were immediately pink and well perfused. Hemostasis was easily obtained with direct pressure and bipolar cautery. The subcutaneous tissue was closed with interrupted sutures. The skin was closed with interrupted absorbable sutures and local ansethetic was instilled for postoperative analgesia. The wound was dressed with Adaptic dry sterile dressings and a bulky long arm Richardson type dressing was applied. The patient was awakened from anesthesia, having tolerated the procedure without apparent complication, and was returned to the recovery room in stable condition. At the conclusion of the procedure all needle, instrument and sponge counts were correct. Ranjith Walker MD   4/7/2022 , 8:06 AM

## 2022-04-07 NOTE — ANESTHESIA POSTPROCEDURE EVALUATION
Department of Anesthesiology  Postprocedure Note    Patient: Vero Jeronimo  MRN: [id number]  YOB: 1945  Date of evaluation: 4/7/2022  Time:  9:11 AM     Procedure Summary     Date: 04/07/22 Room / Location: [address]    Anesthesia Start: 8047 Anesthesia Stop: 4999    Procedure: LEFT ULNAR NERVE DECOMPRESSION AT ELBOW (Left ) Diagnosis:       Cubital tunnel syndrome on left      (LEFT CUBITAL TUNNEL SYNDROME/ ULNAR NERVE ENTRAPMENT AT ELBOW)    Surgeons: Antonio Deras MD Responsible Provider: Nathanael Pineda MD    Anesthesia Type: general ASA Status: 2          Anesthesia Type: general    Steve Phase I: Steve Score: 10    Steve Phase II: Steve Score: 10    Last vitals: Reviewed and per EMR flowsheets.        Anesthesia Post Evaluation    Patient location during evaluation: PACU  Level of consciousness: awake and alert  Airway patency: patent  Nausea & Vomiting: no nausea and no vomiting  Complications: no  Cardiovascular status: blood pressure returned to baseline  Respiratory status: acceptable  Hydration status: euvolemic  Comments: Postoperative Anesthesia Note    Name:    Vero Jeronimo  MRN:      [id number]    Patient Vitals in the past 12 hrs:  04/07/22 0850, BP:(!) 109/47, Temp:97.4 °F (36.3 °C), Temp src:Temporal, Pulse:83, Resp:12, SpO2:98 %  04/07/22 0845, BP:115/65, Pulse:82, Resp:13, SpO2:99 %  04/07/22 0843, Pulse:86, Resp:16, SpO2:98 %  04/07/22 0838, BP:118/68, Pulse:84, Resp:11, SpO2:99 %  04/07/22 0837, Pulse:84, Resp:17, SpO2:98 %  04/07/22 0830, BP:113/64, Pulse:80, Resp:12, SpO2:100 %  04/07/22 0825, BP:109/65, Pulse:82, Resp:15, SpO2:100 %  04/07/22 0820, BP:(!) 96/56, Pulse:75, Resp:12, SpO2:99 %  04/07/22 0818, BP:(!) 103/58, Pulse:75, Resp:12, SpO2:99 %  04/07/22 0815, BP:(!) 79/45, Pulse:79, Resp:12, SpO2:99 %  04/07/22 0810, BP:(!) 78/46, Pulse:78, Resp:11, SpO2:99 %  04/07/22 0807, BP:(!) 77/39, Temp:98.7 °F (37.1 °C), Temp src:Temporal, Pulse:78, Resp:11, SpO2:98 %  04/07/22 0701, BP:138/80, Temp:97 °F (36.1 °C), Temp src:Temporal, Pulse:96, Resp:18, SpO2:96 %, Height:5' 1\" (1.549 m), Weight:113 lb 12.1 oz (51.6 kg)     LABS:    CBC  No results found for: WBC, HGB, HCT, PLT  RENAL  No results found for: NA, K, CL, CO2, BUN, CREATININE, GLUCOSE  COAGS  No results found for: PROTIME, INR, APTT    Intake & Output:  @87KGKQ@    Nausea & Vomiting:  No    Level of Consciousness:  Awake    Pain Assessment:  Adequate analgesia    Anesthesia Complications:  No apparent anesthetic complications    SUMMARY      Vital signs stable  OK to discharge from Stage I post anesthesia care.   Care transferred from Anesthesiology department on discharge from perioperative area

## 2022-04-07 NOTE — H&P
Pre-operative Update of H&P:    I  have seen & examined Ms. Iniguez Chu related solely to her hand and upper extremity conditions, prior to the scheduled procedure on the date of her surgery. The indications for the planned surgical procedure & and her upper-extremity condition are unchanged.

## 2022-04-07 NOTE — PROGRESS NOTES
1183 pt arrived from OR, sedate, OPA present. BP low, fluid opened, pt placed in trendelenburg. Sats stable on 4L O2. Left arm dressing clean, dry, and intact. Left radial pulse 1+, good cap refill, fingers warm.

## 2022-04-07 NOTE — PROGRESS NOTES
Pt alert and oriented, denies pain. Taking ice chips. Vitals stable on room air. Reports numbness in left fingers.

## 2022-04-07 NOTE — PROGRESS NOTES
BP remains trending up, pt arouses to voice, denies pain. Sats stable on room air. 2+ bilateral pulses, pt moves fingers to command.

## 2022-04-07 NOTE — PROGRESS NOTES
Arrived to Phase 2 recovery via stretcher from PACU post LEFT ULNAR NERVE DECOMPRESSION AT ELBOW - Left . Alert. Denies discomfort. Dressing intact left arm. ROM intact left arm.

## 2022-04-07 NOTE — ANESTHESIA PRE PROCEDURE
[facility] Department of Anesthesiology  Pre-Anesthesia Evaluation/Consultation       Name:  Jose Connor  : 1945  Age:  67 y.o.                                            MRN:  [id number]  Date: 2022           Surgeon: Surgeon(s):  Susi Cantu MD    Procedure: Procedure(s):  LEFT ULNAR NERVE DECOMPRESSION AT ELBOW     Allergies   Allergen Reactions    Penicillins Rash     Pt states was hospitalized after taking PCN    Acetaminophen-Codeine Nausea And Vomiting     TYLENOL #3    Demerol Nausea And Vomiting    Demerol Hcl [Meperidine] Nausea And Vomiting    Pcn [Penicillins] Hives     Went to hospital for hives     Patient Active Problem List   Diagnosis    Tendinitis of right rotator cuff    Biceps tendinitis of right upper extremity    Left knee pain    Primary osteoarthritis of left knee    Cubital tunnel syndrome     Past Medical History:   Diagnosis Date    Arthritis     Hyperlipidemia     Primary osteoarthritis of left knee 2021    Shingles 2002    Tuberculosis     lymphnode TB     Past Surgical History:   Procedure Laterality Date    ABDOMEN SURGERY      bowel blockage-     ARM SURGERY Right 2022    RIGHT ULNAR NERVE DECOMPRESSION (AT ELBOW) [facility] [address] [facility]  [facility]    CATARACT REMOVAL WITH IMPLANT Bilateral     COLON SURGERY      bowel obstruction    ELBOW SURGERY      nerve damage repair     FACIAL COSMETIC SURGERY      2010- eye lift     HERNIA REPAIR      HERNIA REPAIR      1983    HYSTERECTOMY      complete    HYSTERECTOMY  1988    TUBAL LIGATION  1984     Social History     Tobacco Use    Smoking status: Never Smoker    Smokeless tobacco: Never Used   Vaping Use    Vaping Use: Never used   Substance Use Topics    Alcohol use: Not Currently    Drug use: Never     Medications  Current Facility-Administered Medications on File Prior to Visit   Medication Dose Route Frequency Provider Last Rate Last Admin    0.9 % sodium chloride infusion   IntraVENous Continuous Alexandra Jean-Baptiste MD        sodium chloride flush 0.9 % injection 10 mL  10 mL IntraVENous 2 times per day Alexandra Jean-Baptiste MD        sodium chloride flush 0.9 % injection 10 mL  10 mL IntraVENous PRN Alexandra Jean-Baptiste MD        0.9 % sodium chloride infusion  25 mL IntraVENous PRN Alexandra Jean-Baptiste MD         Current Outpatient Medications on File Prior to Visit   Medication Sig Dispense Refill    meloxicam (MOBIC) 15 MG tablet TAKE 1 TABLET BY MOUTH EVERY DAY 30 tablet 0    omeprazole (PRILOSEC) 20 MG delayed release capsule Take 20 mg by mouth daily      atorvastatin (LIPITOR) 10 MG tablet Take 10 mg by mouth daily      meloxicam (MOBIC) 15 MG tablet Take 15 mg by mouth daily      meloxicam (MOBIC) 15 MG tablet TAKE 1 TABLET BY MOUTH EVERY DAY (Patient taking differently: Pt to stop taking on ) 30 tablet 0    atorvastatin (LIPITOR) 10 MG tablet Take 10 mg by mouth daily       No current facility-administered medications for this visit. No current outpatient medications on file. Facility-Administered Medications Ordered in Other Visits   Medication Dose Route Frequency Provider Last Rate Last Admin    0.9 % sodium chloride infusion   IntraVENous Continuous Alexandra Jean-Baptiste MD        sodium chloride flush 0.9 % injection 10 mL  10 mL IntraVENous 2 times per day Alexandra Jean-Baptiste MD        sodium chloride flush 0.9 % injection 10 mL  10 mL IntraVENous PRN Alexandra Jean-Baptiste MD        0.9 % sodium chloride infusion  25 mL IntraVENous PRN Alexandra Jean-Baptiste MD         Vital Signs (Current)   There were no vitals filed for this visit.                                          Vital Signs Statistics (for past 48 hrs)     Temp  Av °F (36.1 °C)  Min: 97 °F (36.1 °C)   Min taken time: 22  Max: 97 °F (36.1 °C)   Max taken time: 22  Pulse  Av  Min: 96   Min taken time: 22  Max: 96   Max taken time: 22  Resp  Av Min: 25   Min taken time: 22  Max: 25   Max taken time: 22  BP  Min: 138/80   Min taken time: 22  Max: 138/80   Max taken time: 22  SpO2  Av %  Min: 96 %   Min taken time: 22  Max: 96 %   Max taken time: 22  BP Readings from Last 3 Encounters:   22 138/80   22 (!) 82/47   22 122/70       BMI  There is no height or weight on file to calculate BMI. Estimated body mass index is 21.49 kg/m² as calculated from the following:    Height as of an earlier encounter on 22: 5' 1\" (1.549 m). Weight as of an earlier encounter on 22: 113 lb 12.1 oz (51.6 kg). CBC No results found for: WBC, RBC, HGB, HCT, MCV, RDW, PLT  CMP  No results found for: NA, K, CL, CO2, BUN, CREATININE, GFRAA, AGRATIO, LABGLOM, GLUCOSE, GLU, PROT, CALCIUM, BILITOT, ALKPHOS, AST, ALT  BMP  No results found for: NA, K, CL, CO2, BUN, CREATININE, CALCIUM, GFRAA, LABGLOM, GLUCOSE, GLU  POCGlucose  No results for input(s): GLUCOSE in the last 72 hours.    Coags  No results found for: PROTIME, INR, APTT  HCG (If Applicable) No results found for: PREGTESTUR, PREGSERUM, HCG, HCGQUANT   ABGs No results found for: PHART, PO2ART, XHZ8IXJ, YFS2VQG, BEART, Q3NEOVCH   Type & Screen (If Applicable)  No results found for: LABABO, LABRH                         BMI: Wt Readings from Last 3 Encounters:       NPO Status:                          Anesthesia Evaluation  Patient summary reviewed no history of anesthetic complications:   Airway: Mallampati: II  TM distance: >3 FB   Neck ROM: full  Mouth opening: > = 3 FB Dental: normal exam         Pulmonary:       (-) COPD, asthma, sleep apnea and not a current smoker                           Cardiovascular:  Exercise tolerance: good (>4 METS),   (+) hyperlipidemia    (-) hypertension, past MI, CABG/stent and  angina                Neuro/Psych:      (-) seizures, TIA and CVA           GI/Hepatic/Renal:        (-) GERD, hepatitis and liver disease       Endo/Other:        (-) diabetes mellitus, hypothyroidism               Abdominal:             Vascular:     - DVT and PE. Other Findings:               Anesthesia Plan      general     ASA 2     (I discussed intravenous sedation to the patient's satisfaction including risks and alternatives. The patient agreed with the plan and has no further questions. Sherwin Bauamnn MD )  Induction: intravenous. MIPS: Postoperative opioids intended and Prophylactic antiemetics administered. Anesthetic plan and risks discussed with patient. Plan discussed with CRNA. This pre-anesthesia assessment may be used as a history and physical.    DOS STAFF ADDENDUM:    Pt seen and examined, chart reviewed (including anesthesia, drug and allergy history). No interval changes to history and physical examination. Anesthetic plan, risks, benefits, alternatives, and personnel involved discussed with patient. Patient verbalized an understanding and agrees to proceed.       Sherwin Baumann MD  April 7, 2022  7:20 AM

## 2022-04-18 ENCOUNTER — OFFICE VISIT (OUTPATIENT)
Dept: ORTHOPEDIC SURGERY | Age: 77
End: 2022-04-18

## 2022-04-18 VITALS — BODY MASS INDEX: 21.34 KG/M2 | WEIGHT: 113 LBS | HEIGHT: 61 IN

## 2022-04-18 DIAGNOSIS — G56.22 CUBITAL TUNNEL SYNDROME ON LEFT: Primary | ICD-10-CM

## 2022-04-18 PROCEDURE — 99024 POSTOP FOLLOW-UP VISIT: CPT | Performed by: ORTHOPAEDIC SURGERY

## 2022-04-18 NOTE — PATIENT INSTRUCTIONS
Postoperative Instructions After Ulnar Nerve Decompression    Dr. Haroldo Carvalho        1. After bandages are removed one week from surgery, you may chose to wear a small bandage over the incision if you wish, though you do not need to. 2. Keep incision dry until sutures have fully dissolved  or it has been 14 days since your surgery. Thereafter, you may wash with mild soap and water and shower normally. 3. Once your stiches have fully disappeared & skin appears normal, you should begin gently massaging the incision with Vitamin E (may use Vitamin E lotion or contents of Vitamin E capsule). 4. Work hard on motion of the fingers and wrist, straightening each finger fully and bending each finger fully, bending wrist forward and bending wrist backwards. Do not be concerned if you experience discomfort. This will not damage the surgery. 5. You may begin using the hand as it feels comfortable beginning 12-14 days from the day of surgery. You may not feel entirely comfortable gripping or lifting heavy objects for several weeks. 6. You may expect to see some skin peel off around the incision. You may be left with a small area of pink baby skin. This is quite normal.    Thank you for choosing [facility]) Physicians for your Hand and Upper Extremity needs. If we can be of any further assistance to you, please do not hesitate to contact us.     Office Phone Number:  [phone]  or  [phone]

## 2022-04-18 NOTE — PROGRESS NOTES
Ms. Penny Tavera returns today in follow-up of her recent right Ulnar Nerve Decompression (Cubital Tunnel Release) & Carpal Tunnel Release done approximately 2 weeks ago. She has done well noting no discomfort and no other reported complications. She notes pre-operative symptoms to be completely resolved at this time. Physical Exam:  Bandage intact and well cared for  Skin incision is healing well, without erythema, drainage or sign of infection. Digital range of motion is Full. Wrist range of motion is Full. Elbow range of motion is Full. Sensation is completely resolved in the Median Innervated Digits and Ulnar Innervated Digits. Vascular examination reveals normal, good capillary refill and good color. Swelling is minimal.  Sensory and Motor Median & Ulnar Nerve function is intact. Impression:  Ms. Penny Tavera is doing well after recent left Ulnar Nerve Decompression (Cubital Tunnel Release) &  Carpal Tunnel Release. Plan:  Ms. Penny Tavera is instructed in work on Active & Passive range of motion of the digits, wrist, & elbow. These modalities were specifically demonstrated to her today. We discussed the appropriateness of gradual resumption of use of the operated hand and the return to normal use as comfort allows. She is given instructions regarding management of the fresh surgical incision and progressive use of desensitization and tissue massage techniques. We discussed the appropriate expectations and timeline for symptom improvement. She is provided a written patient instruction sheet titled: Postoperative Instructions After Ulnar Nerve Decompression. I have asked Ms. Penny Tavera to follow-up with me or contact me by telephone over the next 2-4 weeks if her symptoms have not fully resolved or if she has not regained full & painless return of function.       She is also specifically instructed to return to the office or call for an appointment sooner if her symptoms are changing or worsening prior to that time.

## 2023-03-23 ENCOUNTER — APPOINTMENT (OUTPATIENT)
Dept: CT IMAGING | Age: 78
DRG: 069 | End: 2023-03-23
Payer: MEDICARE

## 2023-03-23 ENCOUNTER — APPOINTMENT (OUTPATIENT)
Dept: MRI IMAGING | Age: 78
DRG: 069 | End: 2023-03-23
Payer: MEDICARE

## 2023-03-23 ENCOUNTER — HOSPITAL ENCOUNTER (INPATIENT)
Age: 78
LOS: 1 days | Discharge: HOME | DRG: 069 | End: 2023-03-24
Attending: EMERGENCY MEDICINE | Admitting: INTERNAL MEDICINE
Payer: MEDICARE

## 2023-03-23 DIAGNOSIS — N17.9 AKI (ACUTE KIDNEY INJURY) (HCC): ICD-10-CM

## 2023-03-23 DIAGNOSIS — I63.9 CEREBROVASCULAR ACCIDENT (CVA), UNSPECIFIED MECHANISM (HCC): Primary | ICD-10-CM

## 2023-03-23 PROBLEM — R20.2 NUMBNESS AND TINGLING OF RIGHT ARM: Status: ACTIVE | Noted: 2023-03-23

## 2023-03-23 PROBLEM — R47.01 APHASIA: Status: ACTIVE | Noted: 2023-03-23

## 2023-03-23 PROBLEM — R29.898 RIGHT ARM WEAKNESS: Status: ACTIVE | Noted: 2023-03-23

## 2023-03-23 PROBLEM — R20.0 NUMBNESS AND TINGLING OF RIGHT ARM: Status: ACTIVE | Noted: 2023-03-23

## 2023-03-23 LAB
ALBUMIN SERPL-MCNC: 5.3 G/DL (ref 3.4–5)
ALP SERPL-CCNC: 76 U/L (ref 40–129)
ALT SERPL-CCNC: 15 U/L (ref 10–40)
ANION GAP SERPL CALCULATED.3IONS-SCNC: 24 MMOL/L (ref 3–16)
AST SERPL-CCNC: 33 U/L (ref 15–37)
BASE EXCESS BLDV CALC-SCNC: -3.4 MMOL/L (ref -2–3)
BASOPHILS # BLD: 0 K/UL (ref 0–0.2)
BASOPHILS NFR BLD: 0.2 %
BILIRUB DIRECT SERPL-MCNC: <0.2 MG/DL (ref 0–0.3)
BILIRUB INDIRECT SERPL-MCNC: ABNORMAL MG/DL (ref 0–1)
BILIRUB SERPL-MCNC: 0.3 MG/DL (ref 0–1)
BILIRUB UR QL STRIP.AUTO: ABNORMAL
BUN SERPL-MCNC: 29 MG/DL (ref 7–20)
CALCIUM SERPL-MCNC: 11 MG/DL (ref 8.3–10.6)
CHLORIDE SERPL-SCNC: 101 MMOL/L (ref 99–110)
CLARITY UR: CLEAR
CO2 BLDV-SCNC: 24 MMOL/L
CO2 SERPL-SCNC: 17 MMOL/L (ref 21–32)
COHGB MFR BLDV: 0.9 % (ref 0–1.5)
COLOR UR: ABNORMAL
CREAT SERPL-MCNC: 2.3 MG/DL (ref 0.6–1.2)
DEPRECATED RDW RBC AUTO: 13.1 % (ref 12.4–15.4)
DO-HGB MFR BLDV: 77.7 %
EKG ATRIAL RATE: 94 BPM
EKG DIAGNOSIS: NORMAL
EKG P AXIS: 80 DEGREES
EKG P-R INTERVAL: 152 MS
EKG Q-T INTERVAL: 356 MS
EKG QRS DURATION: 84 MS
EKG QTC CALCULATION (BAZETT): 445 MS
EKG R AXIS: 60 DEGREES
EKG T AXIS: 52 DEGREES
EKG VENTRICULAR RATE: 94 BPM
EOSINOPHIL # BLD: 0 K/UL (ref 0–0.6)
EOSINOPHIL NFR BLD: 0 %
GFR SERPLBLD CREATININE-BSD FMLA CKD-EPI: 21 ML/MIN/{1.73_M2}
GLUCOSE BLD-MCNC: 89 MG/DL (ref 70–99)
GLUCOSE SERPL-MCNC: 102 MG/DL (ref 70–99)
GLUCOSE UR STRIP.AUTO-MCNC: NEGATIVE MG/DL
HCO3 BLDV-SCNC: 22.9 MMOL/L (ref 24–28)
HCT VFR BLD AUTO: 40.3 % (ref 36–48)
HGB BLD-MCNC: 13.5 G/DL (ref 12–16)
HGB UR QL STRIP.AUTO: NEGATIVE
KETONES UR STRIP.AUTO-MCNC: 15 MG/DL
LACTATE BLDV-SCNC: 1.3 MMOL/L (ref 0.4–2)
LEUKOCYTE ESTERASE UR QL STRIP.AUTO: NEGATIVE
LYMPHOCYTES # BLD: 0.5 K/UL (ref 1–5.1)
LYMPHOCYTES NFR BLD: 3.8 %
MCH RBC QN AUTO: 30.3 PG (ref 26–34)
MCHC RBC AUTO-ENTMCNC: 33.6 G/DL (ref 31–36)
MCV RBC AUTO: 90.2 FL (ref 80–100)
METHGB MFR BLDV: 0.7 % (ref 0–1.5)
MONOCYTES # BLD: 1.2 K/UL (ref 0–1.3)
MONOCYTES NFR BLD: 8.8 %
NEUTROPHILS # BLD: 11.8 K/UL (ref 1.7–7.7)
NEUTROPHILS NFR BLD: 87.2 %
NITRITE UR QL STRIP.AUTO: NEGATIVE
PCO2 BLDV: 45.3 MMHG (ref 41–51)
PERFORMED ON: NORMAL
PH BLDV: 7.31 [PH] (ref 7.35–7.45)
PH UR STRIP.AUTO: 6 [PH] (ref 5–8)
PLATELET # BLD AUTO: 238 K/UL (ref 135–450)
PMV BLD AUTO: 9.7 FL (ref 5–10.5)
PO2 BLDV: <30 MMHG (ref 25–40)
POTASSIUM SERPL-SCNC: 4.4 MMOL/L (ref 3.5–5.1)
PROT SERPL-MCNC: 8 G/DL (ref 6.4–8.2)
PROT UR STRIP.AUTO-MCNC: NEGATIVE MG/DL
RBC # BLD AUTO: 4.47 M/UL (ref 4–5.2)
SAO2 % BLDV: 21 %
SODIUM SERPL-SCNC: 142 MMOL/L (ref 136–145)
SODIUM UR-SCNC: <20 MMOL/L
SP GR UR STRIP.AUTO: 1.02 (ref 1–1.03)
TROPONIN T SERPL-MCNC: 0.03 NG/ML
UA COMPLETE W REFLEX CULTURE PNL UR: ABNORMAL
UA DIPSTICK W REFLEX MICRO PNL UR: ABNORMAL
URN SPEC COLLECT METH UR: ABNORMAL
UROBILINOGEN UR STRIP-ACNC: 0.2 E.U./DL
WBC # BLD AUTO: 13.5 K/UL (ref 4–11)

## 2023-03-23 PROCEDURE — 80076 HEPATIC FUNCTION PANEL: CPT

## 2023-03-23 PROCEDURE — 82803 BLOOD GASES ANY COMBINATION: CPT

## 2023-03-23 PROCEDURE — 84165 PROTEIN E-PHORESIS SERUM: CPT

## 2023-03-23 PROCEDURE — 84300 ASSAY OF URINE SODIUM: CPT

## 2023-03-23 PROCEDURE — 99223 1ST HOSP IP/OBS HIGH 75: CPT | Performed by: PSYCHIATRY & NEUROLOGY

## 2023-03-23 PROCEDURE — 93005 ELECTROCARDIOGRAM TRACING: CPT | Performed by: EMERGENCY MEDICINE

## 2023-03-23 PROCEDURE — APPNB45 APP NON BILLABLE 31-45 MINUTES

## 2023-03-23 PROCEDURE — 80048 BASIC METABOLIC PNL TOTAL CA: CPT

## 2023-03-23 PROCEDURE — 99285 EMERGENCY DEPT VISIT HI MDM: CPT

## 2023-03-23 PROCEDURE — 84484 ASSAY OF TROPONIN QUANT: CPT

## 2023-03-23 PROCEDURE — 92610 EVALUATE SWALLOWING FUNCTION: CPT

## 2023-03-23 PROCEDURE — 2580000003 HC RX 258: Performed by: STUDENT IN AN ORGANIZED HEALTH CARE EDUCATION/TRAINING PROGRAM

## 2023-03-23 PROCEDURE — 70498 CT ANGIOGRAPHY NECK: CPT

## 2023-03-23 PROCEDURE — 85025 COMPLETE CBC W/AUTO DIFF WBC: CPT

## 2023-03-23 PROCEDURE — 6370000000 HC RX 637 (ALT 250 FOR IP): Performed by: STUDENT IN AN ORGANIZED HEALTH CARE EDUCATION/TRAINING PROGRAM

## 2023-03-23 PROCEDURE — 81003 URINALYSIS AUTO W/O SCOPE: CPT

## 2023-03-23 PROCEDURE — 6360000002 HC RX W HCPCS: Performed by: STUDENT IN AN ORGANIZED HEALTH CARE EDUCATION/TRAINING PROGRAM

## 2023-03-23 PROCEDURE — 1200000000 HC SEMI PRIVATE

## 2023-03-23 PROCEDURE — 83605 ASSAY OF LACTIC ACID: CPT

## 2023-03-23 PROCEDURE — 70551 MRI BRAIN STEM W/O DYE: CPT

## 2023-03-23 PROCEDURE — 6360000004 HC RX CONTRAST MEDICATION: Performed by: EMERGENCY MEDICINE

## 2023-03-23 PROCEDURE — 70450 CT HEAD/BRAIN W/O DYE: CPT

## 2023-03-23 PROCEDURE — 36415 COLL VENOUS BLD VENIPUNCTURE: CPT

## 2023-03-23 PROCEDURE — 82570 ASSAY OF URINE CREATININE: CPT

## 2023-03-23 PROCEDURE — 84155 ASSAY OF PROTEIN SERUM: CPT

## 2023-03-23 RX ORDER — GLUCAGON 1 MG/ML
1 KIT INJECTION PRN
Status: DISCONTINUED | OUTPATIENT
Start: 2023-03-23 | End: 2023-03-24 | Stop reason: HOSPADM

## 2023-03-23 RX ORDER — ATORVASTATIN CALCIUM 80 MG/1
80 TABLET, FILM COATED ORAL NIGHTLY
Status: DISCONTINUED | OUTPATIENT
Start: 2023-03-23 | End: 2023-03-24

## 2023-03-23 RX ORDER — ONDANSETRON 4 MG/1
4 TABLET, ORALLY DISINTEGRATING ORAL EVERY 8 HOURS PRN
Status: DISCONTINUED | OUTPATIENT
Start: 2023-03-23 | End: 2023-03-24 | Stop reason: HOSPADM

## 2023-03-23 RX ORDER — ASPIRIN 300 MG/1
300 SUPPOSITORY RECTAL DAILY
Status: DISCONTINUED | OUTPATIENT
Start: 2023-03-23 | End: 2023-03-24 | Stop reason: HOSPADM

## 2023-03-23 RX ORDER — SODIUM CHLORIDE 9 MG/ML
INJECTION, SOLUTION INTRAVENOUS CONTINUOUS
Status: ACTIVE | OUTPATIENT
Start: 2023-03-23 | End: 2023-03-23

## 2023-03-23 RX ORDER — 0.9 % SODIUM CHLORIDE 0.9 %
1000 INTRAVENOUS SOLUTION INTRAVENOUS ONCE
Status: COMPLETED | OUTPATIENT
Start: 2023-03-23 | End: 2023-03-23

## 2023-03-23 RX ORDER — DEXTROSE MONOHYDRATE 100 MG/ML
INJECTION, SOLUTION INTRAVENOUS CONTINUOUS PRN
Status: DISCONTINUED | OUTPATIENT
Start: 2023-03-23 | End: 2023-03-24 | Stop reason: HOSPADM

## 2023-03-23 RX ORDER — HEPARIN SODIUM 5000 [USP'U]/ML
5000 INJECTION, SOLUTION INTRAVENOUS; SUBCUTANEOUS 2 TIMES DAILY
Status: DISCONTINUED | OUTPATIENT
Start: 2023-03-23 | End: 2023-03-24 | Stop reason: HOSPADM

## 2023-03-23 RX ORDER — ASPIRIN 81 MG/1
81 TABLET ORAL DAILY
Status: DISCONTINUED | OUTPATIENT
Start: 2023-03-23 | End: 2023-03-24 | Stop reason: HOSPADM

## 2023-03-23 RX ORDER — POLYETHYLENE GLYCOL 3350 17 G/17G
17 POWDER, FOR SOLUTION ORAL DAILY PRN
Status: DISCONTINUED | OUTPATIENT
Start: 2023-03-23 | End: 2023-03-24 | Stop reason: HOSPADM

## 2023-03-23 RX ORDER — ONDANSETRON 2 MG/ML
4 INJECTION INTRAMUSCULAR; INTRAVENOUS EVERY 6 HOURS PRN
Status: DISCONTINUED | OUTPATIENT
Start: 2023-03-23 | End: 2023-03-24 | Stop reason: HOSPADM

## 2023-03-23 RX ADMIN — ASPIRIN 81 MG: 81 TABLET, COATED ORAL at 10:47

## 2023-03-23 RX ADMIN — ATORVASTATIN CALCIUM 80 MG: 80 TABLET, FILM COATED ORAL at 21:32

## 2023-03-23 RX ADMIN — SODIUM CHLORIDE 1000 ML: 9 INJECTION, SOLUTION INTRAVENOUS at 12:34

## 2023-03-23 RX ADMIN — HEPARIN SODIUM 5000 UNITS: 5000 INJECTION INTRAVENOUS; SUBCUTANEOUS at 21:32

## 2023-03-23 RX ADMIN — HEPARIN SODIUM 5000 UNITS: 5000 INJECTION INTRAVENOUS; SUBCUTANEOUS at 12:35

## 2023-03-23 RX ADMIN — SODIUM CHLORIDE: 9 INJECTION, SOLUTION INTRAVENOUS at 13:49

## 2023-03-23 RX ADMIN — IOPAMIDOL 75 ML: 755 INJECTION, SOLUTION INTRAVENOUS at 04:59

## 2023-03-23 ASSESSMENT — ENCOUNTER SYMPTOMS
NAUSEA: 1
VOMITING: 0
GASTROINTESTINAL NEGATIVE: 1
SORE THROAT: 0
EYES NEGATIVE: 1
ABDOMINAL PAIN: 0
RHINORRHEA: 0
ABDOMINAL DISTENTION: 0
CONSTIPATION: 0
SHORTNESS OF BREATH: 0
RESPIRATORY NEGATIVE: 1
DIARRHEA: 0
COUGH: 0
BLOOD IN STOOL: 0

## 2023-03-23 NOTE — CONSULTS
Neurologic- R sided weakness    Past Medical, Surgical, Family, and Social History   PAST MEDICAL HISTORY:  Past Medical History:   Diagnosis Date    Arthritis     Hyperlipidemia     Primary osteoarthritis of left knee 12/14/2021    Shingles 2002    Tuberculosis     lymphnode TB     SURGICAL HISTORY:  Past Surgical History:   Procedure Laterality Date    ABDOMEN SURGERY      bowel blockage- 1983    ARM SURGERY Right 1/27/2022    RIGHT ULNAR NERVE DECOMPRESSION (AT ELBOW) performed by Francisco Javier Javier MD at [place] [address] Left 4/7/2022    LEFT ULNAR NERVE DECOMPRESSION AT ELBOW performed by Francisco Javier Javier MD at [facility] 1737 2016    CATARACT REMOVAL WITH IMPLANT Bilateral     COLON SURGERY      bowel obstruction    ELBOW SURGERY      nerve damage repair     FACIAL COSMETIC SURGERY      2010- eye lift     HERNIA REPAIR      HERNIA REPAIR      1983    HYSTERECTOMY (CERVIX STATUS UNKNOWN)      complete    HYSTERECTOMY (CERVIX STATUS UNKNOWN)  1988    TUBAL LIGATION  1984     FAMILY HISTORY & SOCIAL HISTORY:  Family history non-contributory  Family History   Problem Relation Age of Onset    Stroke Mother     Other Father         brain anuerysm     Social History     Tobacco Use    Smoking status: Never    Smokeless tobacco: Never   Vaping Use    Vaping Use: Never used   Substance Use Topics    Alcohol use: Not Currently    Drug use: Never         Allergies & Outpatient Medications   ALLERGIES:  Allergies   Allergen Reactions    Penicillins Rash     Pt states was hospitalized after taking PCN    Acetaminophen-Codeine Nausea And Vomiting     TYLENOL #3    Demerol Nausea And Vomiting    Demerol Hcl [Meperidine] Nausea And Vomiting    Pcn [Penicillins] Hives     Went to hospital for hives     HOME MEDICATIONS:  Patient's Medications   New Prescriptions    No medications on file   Previous Medications    ATORVASTATIN (LIPITOR) 10 MG TABLET    Take 10 mg by mouth daily    ATORVASTATIN (LIPITOR) 10 MG TABLET    Take 10 mg by mouth daily    MELOXICAM (MOBIC) 15 MG TABLET    TAKE 1 TABLET BY MOUTH EVERY DAY    MELOXICAM (MOBIC) 15 MG TABLET    TAKE 1 TABLET BY MOUTH EVERY DAY    MELOXICAM (MOBIC) 15 MG TABLET    Take 15 mg by mouth daily    OMEPRAZOLE (PRILOSEC) 20 MG DELAYED RELEASE CAPSULE    Take 20 mg by mouth daily   Modified Medications    No medications on file   Discontinued Medications    No medications on file         Physical Exam   PHYSICAL EXAM:  Vitals:    03/23/23 0424 03/23/23 0426 03/23/23 0700 03/23/23 1000   BP:  101/74 95/60 99/66   Pulse:  (!) 101 88 92   Resp:  18 16 16   Temp: 98.2 °F (36.8 °C)      TempSrc: Oral      SpO2:  95% 95% 95%   Weight:  105 lb (47.6 kg)     Height:  5' (1.524 m)           General: Alert, no distress, well-nourished  Neurologic  Mental status:   Orientation: to person, place, time, situation   Attention: intact as able to attend well to the exam     Language: fluent in conversation   Comprehension intact; follows simple commands    Cranial nerves:   CN2: Visual fields full w/o extinction on confrontational testing   CN 3,4,6: Pupils equal and reactive to light, extraocular muscles intact  CN5: Facial sensation symmetric   CN7: Face symmetric  CN8: Hearing symmetric to spoken voice  CN9: Palate elevated symmetrically  CN11: Traps full strength on shoulder shrug  CN12: Tongue midline with protrusion    Motor Exam: Mild drift in RUE and RLE   R  L    Deltoid 4  5   Biceps 3 5   Triceps 3 5   Wrist extension  4 5   Interossei 4 5      R  L    Hip flexion  4  5   Hip extension  4 5   Knee flexion  4 5   Knee extension  4 5   Ankle dorsiflexion  4 5   Ankle plantar flexion  4 5       Sensory: light touch intact and symmetric in lower extremities.  Reporting R arm numbness below level of elbow on R  Cerebellar/coordination: finger nose finger normal without ataxia, slightly slower on R due to weakness  Tone: normal in all 4 extremities  Gait: held for patient safety  NIHSS:

## 2023-03-23 NOTE — H&P
Internal Medicine  PGY 2  History & Physical      CC Slurred speech, R side weakness    History Obtained From:  patient, family member - daughter, electronic medical record    HISTORY OF PRESENT ILLNESS:    65year old female with PMHx HLD, GERD, osteoarthritis, restless leg syndrome presenting with chief complaint of slurred speech and right-sided weakness. The patient was last seen normal at around 10/11 pm last night. Patient states that she had gotten into the bath because she was experiencing restless leg and spasming, and that bath is typically helpful in alleviating these symptoms. The patient was not able to lift herself out of the bath. Patient's  found her in the bath around 2 am and was unable to help lift her due to patient's weakness. Patient's daughter was called to help assist. The patient and daughter both endorsed that patient was fuzzy and confused during this time. She was noted to have slurred speech and weakness involving right upper and lower extremity. She was afterwards brought to the hospital via EMS. She had resolution of speech symptoms by the time she arrived in the ED, with some persistence of weakness. Her NIH scale was initially a 9 and improved to 2 by time evaluated in ED. Stroke team candidate, not a candidate for thrombolytic therapy. Patient has been in her usual state of health preceding this, without any recent illnesses or sick contacts. She intermittently experiences Lives with  at home. Denies smoking, alcohol or other substance use.     Past Medical History:        Diagnosis Date    Arthritis     Hyperlipidemia     Primary osteoarthritis of left knee 12/14/2021    Shingles 2002    Tuberculosis     lymphnode TB       Past Surgical History:        Procedure Laterality Date    ABDOMEN SURGERY      bowel blockage- 1983    ARM SURGERY Right 1/27/2022    RIGHT ULNAR NERVE DECOMPRESSION (AT ELBOW) performed by Dayanna Jeffery MD at [place] [address] Left BILIRUBINUR, BLOODU, GLUCOSEU, AMORPHOUS in the last 72 hours. Invalid input(s): KETONESU    CTA HEAD NECK W CONTRAST   Final Result   Negative CT angiogram of the head.       _______________________________________________       IMPRESSION:          Negative CTA neck. CT HEAD WO CONTRAST   Final Result   There is no evidence of acute intracranial abnormality. ASSESSMENT AND PLAN:    TIA  -patient with unilateral weakness and slurred speech that resolved with negative CT/CTA imaging, likely representing TIA. The patient also mentioned that she was experiencing spasms in legs and abdomen with confusion and unilateral weakness, so another consideration is that she was post-ictal, though no history of prior seizure.   -aspirin 81 mg   -high intensity statin  -SLP/PT/OT  -check A1C and lipid panel  -q4hr neuro checks  -neuro consult    ODIN vs CKD  -Cr elevated to 2.3. Previous value 0.9 two months ago. Patient without known history of renal disease.   -1L NS bolus + cont @ 100 cc/hr  -check urinalysis  -trend Cr, monitor I/Os   -check urine Na, Cr  -avoid nephrotoxic agents  -discontinue NSAIDs    AGMA  -anion gap elevated to 24. Delta/delta 12/7. -IVF as above  -check urinalysis, lactic acid  -obtain VBG    Hypercalcemia  -Ca noted to be 11. Has been mildly elevated to 10.4 in prior labs. Cr elevated however hgb and platelets wnl. For now monitor and recheck following IVF.           Will discuss with attending physician Dr. Prasad Finch Status:Full code  FEN: NPO, okay for ice chips; advance per swallow screen  PPX: heparin  DISPO: Saad Gaming MD PGY2  3/23/2023,  9:43 AM

## 2023-03-23 NOTE — ED PROVIDER NOTES
approximate NIH stroke scale at that time was a 9. On my initial evaluation, patient has a stroke scale of 2, 1 for right upper extremity weakness and 1 for right upper extremity sensory deficit. Patient was made a code stroke and taken immediately to the CT scanner for imaging. I did discuss the case with the stroke team and it was felt that the patient's currently non-debilitating symptoms as well as unclear last known normal admit she was not an appropriate candidate for thrombolytic therapy. CT angiogram of the head and neck demonstrates no major vascular occlusion. Laboratory studies are significant for creatinine of 2.3 which is above the patient's baseline of 0.9. Potassium is normal.  EKG shows no acute abnormalities. Patient will be admitted to the hospitalist service for further stroke evaluation. Is this patient to be included in the SEP-1 core measure due to severe sepsis or septic shock? No Exclusion criteria - the patient is NOT to be included for SEP-1 Core Measure due to: Infection is not suspected    Medical Decision Making  Problems Addressed:  ODIN (acute kidney injury) [facility]): acute illness or injury  Cerebrovascular accident (CVA), unspecified mechanism ([address]): acute illness or injury    Amount and/or Complexity of Data Reviewed  Labs: ordered. Decision-making details documented in ED Course. Radiology: ordered. Decision-making details documented in ED Course. ECG/medicine tests: ordered and independent interpretation performed. Decision-making details documented in ED Course. Discussion of management or test interpretation with external provider(s): Stroke team - Jigar Gaines  Prescription drug management. Decision regarding hospitalization. Critical Care:  Due to the immediate potential for life-threatening deterioration due to acute CVA, I spent 40 minutes providing critical care.   This time excludes time spent performing procedures but includes time spent on direct patient care, history retrieval, review of the chart, and discussions with patient, family, and consultant(s). Clinical Impression     1. Cerebrovascular accident (CVA), unspecified mechanism ([facility]    2. ODIN (acute kidney injury) ([facility]        Disposition     PATIENT REFERRED TO:  No follow-up provider specified. DISCHARGE MEDICATIONS:  New Prescriptions    No medications on file       DISPOSITION Decision To Admit 03/23/2023 06:55:10 AM        Diagnostic Results and Other Data       RADIOLOGY:  CTA HEAD NECK W CONTRAST   Final Result   Negative CT angiogram of the head.       _______________________________________________       IMPRESSION:          Negative CTA neck. CT HEAD WO CONTRAST   Final Result   There is no evidence of acute intracranial abnormality.               LABS:   Results for orders placed or performed during the hospital encounter of 03/23/23   CBC with Auto Differential   Result Value Ref Range    WBC 13.5 (H) 4.0 - 11.0 K/uL    RBC 4.47 4.00 - 5.20 M/uL    Hemoglobin 13.5 12.0 - 16.0 g/dL    Hematocrit 40.3 36.0 - 48.0 %    MCV 90.2 80.0 - 100.0 fL    MCH 30.3 26.0 - 34.0 pg    MCHC 33.6 31.0 - 36.0 g/dL    RDW 13.1 12.4 - 15.4 %    Platelets 926 316 - 777 K/uL    MPV 9.7 5.0 - 10.5 fL    Neutrophils % 87.2 %    Lymphocytes % 3.8 %    Monocytes % 8.8 %    Eosinophils % 0.0 %    Basophils % 0.2 %    Neutrophils Absolute 11.8 (H) 1.7 - 7.7 K/uL    Lymphocytes Absolute 0.5 (L) 1.0 - 5.1 K/uL    Monocytes Absolute 1.2 0.0 - 1.3 K/uL    Eosinophils Absolute 0.0 0.0 - 0.6 K/uL    Basophils Absolute 0.0 0.0 - 0.2 K/uL   Basic Metabolic Panel   Result Value Ref Range    Sodium 142 136 - 145 mmol/L    Potassium 4.4 3.5 - 5.1 mmol/L    Chloride 101 99 - 110 mmol/L    CO2 17 (L) 21 - 32 mmol/L    Anion Gap 24 (H) 3 - 16    Glucose 102 (H) 70 - 99 mg/dL    BUN 29 (H) 7 - 20 mg/dL    Creatinine 2.3 (H) 0.6 - 1.2 mg/dL    Est, Glom Filt Rate 21 (A) >60    Calcium 11.0 (H) 8.3 - 10.6 mg/dL

## 2023-03-23 NOTE — PROGRESS NOTES
Patient was admitted to room 5511 from ED. She is alert and oriented x4. VSS. She complains of weakness and tingling in RUE. She is able to ambulate SBA, no ambulatory device. She is to get MRI this PM.     She is currently resting in bed with call light within reach. Bed alarm on for safety. Family at bedside.      Electronically signed by Muna Gerber RN on 3/23/2023 at 6:27 PM

## 2023-03-23 NOTE — PROGRESS NOTES
Speech Language Pathology  Facility/Department:[facility] [address] [address]  Dysphagia Evaluation/Discharge  Name: Obed Maldonado  : 1945  MRN: [id number]                                                         Patient Diagnosis(es):   Patient Active Problem List    Diagnosis Date Noted    Acute CVA (cerebrovascular accident) ([facility] 2023    Cubital tunnel syndrome     Left knee pain 2021    Primary osteoarthritis of left knee 2021    Tendinitis of right rotator cuff 2021    Biceps tendinitis of right upper extremity 2021       Past Medical History:   Diagnosis Date    Arthritis     Hyperlipidemia     Primary osteoarthritis of left knee 2021    Shingles 2002    Tuberculosis     lymphnode TB     Past Surgical History:   Procedure Laterality Date    ABDOMEN SURGERY      bowel blockage-     ARM SURGERY Right 2022    RIGHT ULNAR NERVE DECOMPRESSION (AT ELBOW) performed by General Liang MD at [place] [address] Left 2022    LEFT ULNAR NERVE DECOMPRESSION AT ELBOW performed by General Liang MD at [facility] [id number]    CATARACT REMOVAL WITH IMPLANT Bilateral     COLON SURGERY      bowel obstruction    ELBOW SURGERY      nerve damage repair     FACIAL COSMETIC SURGERY      2010- eye lift     [address] (CERVIX STATUS UNKNOWN)      complete    HYSTERECTOMY (CERVIX STATUS UNKNOWN)  [address] [place] [address]       Reason for Referral:  Obed Maldonado  was referred for a Speech Therapy evaluation to assess swallow function and/or communication. History of Present Illness  Per MD, H & P, \"Jonny Forrest is a 65 y.o. female who presents to the emergency department with complaint of right-sided weakness. Patient states that she went to bed approximately 6 and half hours prior to presentation.   She states at some point she got out of bed to go take a bath tough meats. Bedside Swallowing Evaluation Impression (3/23/2023)  Pt supine in stretcher, agreeable to be repositioned upright for bedside swallow evaluation. Regarding dysphagia history, pt expressed being on a PPI at baseline for reflux and endorsed intermittent difficulties with swallowing tough meats. Oral Mechanism Exam was [facility], however pt expressed xerostomia due to current NPO status. Pt's voluntary cough and throat clearing were [facility] [place]. Trials of ice chips, thin liquid via teaspoons, cup sips, sips with straw, puree and regular solids were administered. Pt demonstrated effective masticate of solids and appeared to swallow without difficulties or oral residue. Vocal quality remained unchanged after swallowing PO trials. Pt did not endorse globus sensation and did not demonstrate any s/s associated with aspiration. SLP educated pt and daughter regarding rationale for evaluation as well as the results. Pt and daughter agreed with SLP's recommendation of regular solids/thin liquids. No further SLP services warranted at this time. Please reconsult as needed/appropriate. Prognosis:  Prognosis for improvement: Good  Barriers to reach goals: None  Consulted and agree with results and recommendations: Pt and daughter, RN and perfect serve sent to MD.     Education  Educated regarding rationale for evaluation and results. Total treatment time: 15    Plan: Discharge from SLP services  Recommended Diet: Regular solids and thin liquids. Medication administration: PO    Discharge Recommendation: TBD  Discussed with Janene COBB  Needs met prior to leaving room, call light within reach    Electronically signed by:  Robb Chris Hollie Jeronimo  Speech Language Pathology      Giovanni Garland M.A., [address] [place] [address]  Speech-Language Pathologist  Pg # [phone]    The speech-language pathologist was present, directed the patient's care, made skilled judgment and was responsible for assessment and treatment.

## 2023-03-23 NOTE — PLAN OF CARE
Problem: Neurosensory - Adult  Goal: Achieves stable or improved neurological status  3/23/2023 1817 by Nico Smith RN  Outcome: Progressing  Flowsheets (Taken 3/23/2023 1816)  Achieves stable or improved neurological status: Assess for and report changes in neurological status  Note: Patient's NIH is 2. Tingling and weakness in RUE. Problem: Discharge Planning  Goal: Discharge to home or other facility with appropriate resources  3/23/2023 1817 by Nico Smith RN  Outcome: Progressing  Flowsheets (Taken 3/23/2023 1817)  Discharge to home or other facility with appropriate resources: Identify barriers to discharge with patient and caregiver  Note: Patient is independent and A/O x4 at baseline. Will update case management with any discharge needs. Problem: ABCDS Injury Assessment  Goal: Absence of physical injury  3/23/2023 1817 by Nico Smith RN  Outcome: Progressing  Flowsheets (Taken 3/23/2023 1817)  Absence of Physical Injury: Implement safety measures based on patient assessment  Note: Patient SBA, educated on use of call light before ambulating.

## 2023-03-24 VITALS
HEIGHT: 60 IN | TEMPERATURE: 97.9 F | RESPIRATION RATE: 16 BRPM | OXYGEN SATURATION: 96 % | BODY MASS INDEX: 20.62 KG/M2 | WEIGHT: 105 LBS | HEART RATE: 79 BPM | SYSTOLIC BLOOD PRESSURE: 107 MMHG | DIASTOLIC BLOOD PRESSURE: 69 MMHG

## 2023-03-24 LAB
ALBUMIN SERPL ELPH-MCNC: 4.1 G/DL (ref 3.1–4.9)
ALBUMIN SERPL-MCNC: 4.4 G/DL (ref 3.4–5)
ALPHA1 GLOB SERPL ELPH-MCNC: 0.2 G/DL (ref 0.2–0.4)
ALPHA2 GLOB SERPL ELPH-MCNC: 1 G/DL (ref 0.4–1.1)
ANION GAP SERPL CALCULATED.3IONS-SCNC: 11 MMOL/L (ref 3–16)
B-GLOBULIN SERPL ELPH-MCNC: 1 G/DL (ref 0.9–1.6)
BASOPHILS # BLD: 0 K/UL (ref 0–0.2)
BASOPHILS NFR BLD: 0.6 %
BUN SERPL-MCNC: 19 MG/DL (ref 7–20)
CALCIUM SERPL-MCNC: 9.3 MG/DL (ref 8.3–10.6)
CHLORIDE SERPL-SCNC: 109 MMOL/L (ref 99–110)
CHOLEST SERPL-MCNC: 220 MG/DL (ref 0–199)
CK SERPL-CCNC: 847 U/L (ref 26–192)
CO2 SERPL-SCNC: 23 MMOL/L (ref 21–32)
CREAT SERPL-MCNC: 1 MG/DL (ref 0.6–1.2)
DEPRECATED RDW RBC AUTO: 13 % (ref 12.4–15.4)
EOSINOPHIL # BLD: 0.1 K/UL (ref 0–0.6)
EOSINOPHIL NFR BLD: 1.6 %
GAMMA GLOB SERPL ELPH-MCNC: 1 G/DL (ref 0.6–1.8)
GFR SERPLBLD CREATININE-BSD FMLA CKD-EPI: 58 ML/MIN/{1.73_M2}
GLUCOSE SERPL-MCNC: 95 MG/DL (ref 70–99)
HCT VFR BLD AUTO: 34 % (ref 36–48)
HDLC SERPL-MCNC: 97 MG/DL (ref 40–60)
HGB BLD-MCNC: 11.4 G/DL (ref 12–16)
LDLC SERPL CALC-MCNC: 109 MG/DL
LV EF: 58 %
LVEF MODALITY: NORMAL
LYMPHOCYTES # BLD: 2.2 K/UL (ref 1–5.1)
LYMPHOCYTES NFR BLD: 37 %
MCH RBC QN AUTO: 30.1 PG (ref 26–34)
MCHC RBC AUTO-ENTMCNC: 33.5 G/DL (ref 31–36)
MCV RBC AUTO: 89.9 FL (ref 80–100)
MONOCYTES # BLD: 0.7 K/UL (ref 0–1.3)
MONOCYTES NFR BLD: 12.3 %
NEUTROPHILS # BLD: 2.9 K/UL (ref 1.7–7.7)
NEUTROPHILS NFR BLD: 48.5 %
PHOSPHATE SERPL-MCNC: 2.8 MG/DL (ref 2.5–4.9)
PLATELET # BLD AUTO: 205 K/UL (ref 135–450)
PMV BLD AUTO: 9.6 FL (ref 5–10.5)
POTASSIUM SERPL-SCNC: 4 MMOL/L (ref 3.5–5.1)
PROT SERPL-MCNC: 7.3 G/DL (ref 6.4–8.2)
RBC # BLD AUTO: 3.78 M/UL (ref 4–5.2)
SODIUM SERPL-SCNC: 143 MMOL/L (ref 136–145)
SPE/IFE INTERPRETATION: NORMAL
TRIGL SERPL-MCNC: 71 MG/DL (ref 0–150)
VLDLC SERPL CALC-MCNC: 14 MG/DL
WBC # BLD AUTO: 5.9 K/UL (ref 4–11)

## 2023-03-24 PROCEDURE — 99232 SBSQ HOSP IP/OBS MODERATE 35: CPT

## 2023-03-24 PROCEDURE — 82550 ASSAY OF CK (CPK): CPT

## 2023-03-24 PROCEDURE — C8929 TTE W OR WO FOL WCON,DOPPLER: HCPCS

## 2023-03-24 PROCEDURE — 83036 HEMOGLOBIN GLYCOSYLATED A1C: CPT

## 2023-03-24 PROCEDURE — 97166 OT EVAL MOD COMPLEX 45 MIN: CPT

## 2023-03-24 PROCEDURE — 85025 COMPLETE CBC W/AUTO DIFF WBC: CPT

## 2023-03-24 PROCEDURE — 97530 THERAPEUTIC ACTIVITIES: CPT

## 2023-03-24 PROCEDURE — 97116 GAIT TRAINING THERAPY: CPT

## 2023-03-24 PROCEDURE — 36415 COLL VENOUS BLD VENIPUNCTURE: CPT

## 2023-03-24 PROCEDURE — 84443 ASSAY THYROID STIM HORMONE: CPT

## 2023-03-24 PROCEDURE — 80061 LIPID PANEL: CPT

## 2023-03-24 PROCEDURE — 6370000000 HC RX 637 (ALT 250 FOR IP): Performed by: STUDENT IN AN ORGANIZED HEALTH CARE EDUCATION/TRAINING PROGRAM

## 2023-03-24 PROCEDURE — 97161 PT EVAL LOW COMPLEX 20 MIN: CPT

## 2023-03-24 PROCEDURE — 6360000002 HC RX W HCPCS: Performed by: STUDENT IN AN ORGANIZED HEALTH CARE EDUCATION/TRAINING PROGRAM

## 2023-03-24 PROCEDURE — 97535 SELF CARE MNGMENT TRAINING: CPT

## 2023-03-24 PROCEDURE — 80069 RENAL FUNCTION PANEL: CPT

## 2023-03-24 RX ORDER — ATORVASTATIN CALCIUM 40 MG/1
40 TABLET, FILM COATED ORAL NIGHTLY
Status: DISCONTINUED | OUTPATIENT
Start: 2023-03-24 | End: 2023-03-24 | Stop reason: HOSPADM

## 2023-03-24 RX ORDER — ATORVASTATIN CALCIUM 40 MG/1
40 TABLET, FILM COATED ORAL NIGHTLY
Qty: 30 TABLET | Refills: 0 | Status: SHIPPED | OUTPATIENT
Start: 2023-03-24

## 2023-03-24 RX ORDER — ASPIRIN 81 MG/1
81 TABLET ORAL DAILY
Qty: 30 TABLET | Refills: 3 | Status: SHIPPED | OUTPATIENT
Start: 2023-03-25

## 2023-03-24 RX ADMIN — ASPIRIN 81 MG: 81 TABLET, COATED ORAL at 08:09

## 2023-03-24 RX ADMIN — HEPARIN SODIUM 5000 UNITS: 5000 INJECTION INTRAVENOUS; SUBCUTANEOUS at 08:08

## 2023-03-24 ASSESSMENT — PAIN SCALES - GENERAL
PAINLEVEL_OUTOF10: 0

## 2023-03-24 NOTE — PROGRESS NOTES
Patient discharged home with . All belongings gathered and taken with patient. IV removed. Patient educated on follow up appointments and medications. All questions answered. Patient transported to [facility] via wheelchair.      Electronically signed by Tremaine Ackerman RN on 3/24/2023 at 4:51 PM

## 2023-03-24 NOTE — CARE COORDINATION
CM spoke with patient at bedside. She is from home with spouse, independent pta, drives self. No CM needs at this time. Spouse to transport home at discharge.     Harpreet Sierra RN, BSN,    Ortho/Neuro   [phone]

## 2023-03-24 NOTE — DISCHARGE SUMMARY
Hospital Medicine Discharge Summary    Patient ID: Odalys Alvarez      Patient's PCP: Miriam Stern    Admit Date: 3/23/2023     Discharge Date: 3/24/2023    Admitting Physician: Jennifer Odonnell MD     Discharge Physician: Lilly White MD     Discharge Diagnoses: Active Hospital Problems    Diagnosis Date Noted    Right arm weakness [R29.898] 03/23/2023    Numbness and tingling of right arm [R20.0, R20.2] 03/23/2023    Aphasia [R47.01] 03/23/2023       The patient was seen and examined on day of discharge and this discharge summary is in conjunction with any daily progress note from day of discharge. Condition at discharge - stable    Hospital Course:     65year old female with PMHx HLD, GERD, osteoarthritis, restless leg syndrome presenting with chief complaint of slurred speech and right-sided weakness. The patient was last seen normal at around 10/11 pm last night. Patient states that she had gotten into the bath because she was experiencing restless leg and spasming, and that bath is typically helpful in alleviating these symptoms. The patient was not able to lift herself out of the bath. Patient's  found her in the bath around 2 am and was unable to help lift her due to patient's weakness. Patient's daughter was called to help assist. The patient and daughter both endorsed that patient was fuzzy and confused during this time. She was noted to have slurred speech and weakness involving right upper and lower extremity. She was afterwards brought to the hospital via EMS. She had resolution of speech symptoms by the time she arrived in the ED, with some persistence of weakness. Her NIH scale was initially a 9 and improved to 2 by time evaluated in ED. Stroke team candidate, not a candidate for thrombolytic therapy    TIA: MRI brain and CT head unremarkable. CTA head and neck showed no significant stenosis. Echo with bubble study unremarkable.   ODIN, resolved, likely has tablet, R-0Normal                Details   omeprazole (PRILOSEC) 20 MG delayed release capsule Take 20 mg by mouth dailyHistorical Med             Time Spent on discharge is 37 mints in the examination, evaluation, counseling and review of medications and discharge plan and coordination of care with staff. Signed:    Krystina Smith MD   3/24/2023      Thank you Art Canseco for the opportunity to be involved in this patient's care. If you have any questions or concerns please feel free to contact me at [phone].

## 2023-03-24 NOTE — PROGRESS NOTES
4 Eyes Admission Assessment     I agree as the admission nurse that 2 RN's have performed a thorough Head to Toe Skin Assessment on the patient. ALL assessment sites listed below have been assessed on admission. Areas assessed by both nurses:   [x]   Head, Face, and Ears   [x]   Shoulders, Back, and Chest  [x]   Arms, Elbows, and Hands   [x]   Coccyx, Sacrum, and Ischium  [x]   Legs, Feet, and Heels        Does the Patient have Skin Breakdown?   No         Gallo Prevention initiated:  NA   Wound Care Orders initiated:  No      [facility] nurse consulted for Pressure Injury (Stage 3,4, Unstageable, DTI, NWPT, and Complex wounds) or Gallo score 18 or lower:  No      Nurse 1 eSignature: Electronically signed by Guillermina Meraz RN on 3/24/23 at 4:08 PM EDT    **SHARE this note so that the co-signing nurse is able to place an eSignature**    Nurse 2 eSignature: Electronically signed by Corina Tyson RN on 3/24/23 at 4:48 PM EDT

## 2023-03-24 NOTE — PLAN OF CARE
Problem: Neurosensory - Adult  Goal: Achieves stable or improved neurological status  3/24/2023 1552 by Karyn Callahan RN  Outcome: Progressing  Flowsheets (Taken 3/23/2023 1816)  Achieves stable or improved neurological status: Assess for and report changes in neurological status     Problem: Discharge Planning  Goal: Discharge to home or other facility with appropriate resources  Outcome: Progressing  Flowsheets (Taken 3/23/2023 1817)  Discharge to home or other facility with appropriate resources: Identify barriers to discharge with patient and caregiver  Note: Patient to be discharged home with . Case management following plan of care. Problem: ABCDS Injury Assessment  Goal: Absence of physical injury  3/24/2023 1552 by Karyn Callahan RN  Outcome: Progressing  Flowsheets (Taken 3/23/2023 1817)  Absence of Physical Injury: Implement safety measures based on patient assessment     Problem: Pain  Goal: Verbalizes/displays adequate comfort level or baseline comfort level  Outcome: Progressing  Flowsheets (Taken 3/24/2023 1552)  Verbalizes/displays adequate comfort level or baseline comfort level:   Encourage patient to monitor pain and request assistance   Assess pain using appropriate pain scale  Note: Patient does not complain of any plan at this time. Will continue to monitor and manage per [facility]

## 2023-03-24 NOTE — PROGRESS NOTES
Decreased ADL status; Decreased high-level IADLs;Decreased fine motor control;Decreased strength;Decreased sensation;Decreased coordination  Assessment: Pt independent at baseline. Now presents slightly below her baseline for ADLs and IADLs due to impaired sensation, strength and coordination of dominant R UE. Pt able to engage UE into ADL tasks but has decreased  and dexderity- task completion requires extra time and assist from L UE. Pt demo safe mobitity and ADL status, anticipate pt d/c home with assist from family prn. Pt would benefit from OP OT to further address UE function. Treatment Diagnosis: Impaired R UE motor control limiting ADL/IADL status  Prognosis: Good  Decision Making: Medium Complexity  REQUIRES OT FOLLOW-UP: Yes  Activity Tolerance  Activity Tolerance: Patient Tolerated treatment well        Based upon information gathered in this visit, the patient's preadmission Modified Burtrum Score is: 0- No symptoms at all       Plan   Occupational Therapy Plan  Times Per Week: 5-7  Current Treatment Recommendations: Strengthening, Neuromuscular re-education, Coordination training     Restrictions  Position Activity Restriction  Other position/activity restrictions: up as tolerated    Subjective   General  Chart Reviewed: Yes  Additional Pertinent Hx: Pt presented 3/23 with acute onset R sided weakness and AMS. Admitted for possible TIA vs stroke. CT head (-), CTA head/neck (-), MRI (-)  Family / Caregiver Present: Yes (granddaughter)  Diagnosis: R sided weakness  Subjective  Subjective: Pt in bed, agreeable to therapy. States R UE is improving but still drops things.    Pain: Denied    Social/Functional History  Social/Functional History  Lives With: Spouse  Type of Home: House  Home Layout: Multi-level  Home Access: Stairs to enter with rails  Entrance Stairs - Number of Steps: 12 PATIENCE +18 steps up to bedroom  Bathroom Shower/Tub: Tub only, Walk-in shower  Bathroom Toilet: Standard  Home Equipment: (owns no DME)  ADL Assistance: Independent  Homemaking Assistance: Independent  Ambulation Assistance: Independent  Transfer Assistance: Independent  Active : Yes  Occupation: Retired  Type of Occupation: School admin  Leisure & Hobbies: Shopping, reading, watching TV  Additional Comments: Pt reports no recent falls. Pt reports her  can provide 24hr A at d/c.       Objective     Toilet Transfers  Equipment Used: Standard toilet (grab bar)  Toilet Transfer: Supervision       ADL  Feeding: Modified independent ; Increased time to complete  Grooming: Modified independent ; Increased time to complete (comb hair using R UE)  UE Bathing:  (reports bathing earlier in stance with supervision)  LE Dressing: Modified independent ; Increased time to complete  Toileting: Modified independent ; Increased time to complete  Additional Comments: ADLs limited by impaired strength and coordination of dominant R UE.  Pt incorporates R UE into ADLs but uses L for stabilization, to open containers and hold heavier items       Activity Tolerance  Activity Tolerance: Patient tolerated evaluation without incident;Patient tolerated treatment well    Bed mobility  Supine to Sit: Independent  Scooting: Independent    Transfers  Sit to stand: Supervision  Stand to sit: Supervision    Functional Mobility  Equipment: no AD  Level of Assist: supervision  Distance: to/from bathroom, hallway 400'    Neuro Assessment:  ROM: WFL B UE  Strength: 5/5 L; 4+/5 R shoulder, 4-/5 R elbow, 4-/5 L wrist, 4-/5 , decreased pinch strength  Tone: normal  Sensation: \"pins and needles\" R elbow to distal  Coordination: impaired thumb to digit opposition R, difficulty writing, using cell phone keys   Perception: intact        Vision  Vision: Within Functional Limits  Hearing  Hearing: Within functional limits  Cognition  Overall Cognitive Status: WNL  Orientation  Orientation Level: Oriented X4    Safety Devices  Type of Devices: Gait belt;Left in

## 2023-03-24 NOTE — PROGRESS NOTES
Physical Therapy  Facility/Department: [place] [address]  Physical Therapy Initial Assessment/Treatment/Discharge Summary     Name: Rob Armendariz  : 1945  MRN: [id number]  Date of Service: 3/24/2023    Discharge Recommendations:  Rob Armendariz scored a  on the AM-PAC short mobility form. At this time, no further PT is recommended upon discharge. Recommend patient returns to prior setting with prior services. PT Equipment Recommendations  Equipment Needed: No      Patient Diagnosis(es): The primary encounter diagnosis was Cerebrovascular accident (CVA), unspecified mechanism ([facility] [facility] A diagnosis of ODIN (acute kidney injury) ([facility] [address] was also pertinent to this visit. Past Medical History:  has a past medical history of Arthritis, Hyperlipidemia, Primary osteoarthritis of left knee, Shingles, and Tuberculosis. Past Surgical History:  has a past surgical history that includes Cataract removal with implant (Bilateral); Arm Surgery (Right, 2022); Hysterectomy; Colon surgery; hernia repair; Elbow surgery; Tubal ligation (); Hysterectomy (); Facial cosmetic surgery; Abdomen surgery; hernia repair; Cataract removal (); and Arm Surgery (Left, 2022). Assessment   Assessment: 65 y.o. female who presents to the emergency department with complaint of right-sided weakness. Pt currently at or very near her functional baseline with mobility. Pt independent with bed mobility and transfers, SBA-SUP for amb without AD and SBA for stair negotiation with HR. Pt planning to d/c home with A prn from  and family. Pt with no further acute PT needs at this time. Will sign off from PT services.   Therapy Prognosis: Excellent  Decision Making: Low Complexity  Barriers to Learning: none  Requires PT Follow-Up: No  Activity Tolerance  Activity Tolerance: Patient tolerated evaluation without incident;Patient tolerated treatment well     Plan   Physcial Therapy Plan  General Plan:  (d/c acute

## 2023-03-24 NOTE — PROGRESS NOTES
Aox4  RA  VSS    MRI completed    Denies pain    Standby assist     Voiding per bathroom    N/T and weakness in R arm     Standard safety precautions implemented

## 2023-03-24 NOTE — PROGRESS NOTES
NEUROLOGY / NEUROCRITICAL CARE PROGRESS NOTE       Patient Name: Vivi Vu YOB: 1945   Sex: Female Age: 65 yrs     CC / Reason for Consult: R sided weakness    Interval Hx / Changes over last 24 hours:   MRI brain completed, showing no stroke. RUE weakness improving    ROS: Patient c/o numbness and tingling with slight weakness in her RUE from the elbow down. HISTORY   Admission HPI:   Vivi Vu is a 65 y.o. y/o female with history significant for arthritis, HLD, osteoarthritis, shingles, lymph node tuberculosis (1983). Per interview with the patient, She went to bed on 3/22/23 feeling normal but woke up because of her restless legs and went to take a bath. She felt normal when she got in the bath around 0100 (she thinks, but seems to be a bit unclear on timing) but when she tried to get out around 2 she realized she was too weak to get out. She drained the water but was still unable to get out. Describes feeling weak all over. Her  came in around 2 am and they called her daughter to help her out of the tub. Reports that her speech was quite garbled and she felt \"out of it\". Once EMS arrived she realized that she seemed weaker in her R side. Her  noticed her R sided weakness when he was trying to help her up and described her speech as incomprehensible. Reports she had \"pinched nerves\" in both of her arms about a year ago and says this felt similar, reports she couldn't hold things in her hands at that time but since she has gotten that corrected (had bilateral ulnar nerve decompressions) she has been doing well. She arrived to the ER around 0400 on 3/23 and a code stroke was called. CT head and CTA head and neck unremarkable. BP on arrival 101/74. She was not a candidate for TNK or intervention given no LVO. NIHSS documented in ER as a 2. Does not take antiplatelets or anticoagulation at home. Takes low dose atorvastatin at home.  She does not smoke. Has baseline lower BP, SBP typically runs low 100s. PMH Past Medical History:   Diagnosis Date    Arthritis     Hyperlipidemia     Primary osteoarthritis of left knee 12/14/2021    Shingles 2002    Tuberculosis     lymphnode TB      Allergies Allergies   Allergen Reactions    Penicillins Rash     Pt states was hospitalized after taking PCN    Acetaminophen-Codeine Nausea And Vomiting     TYLENOL #3    Demerol Nausea And Vomiting    Demerol Hcl [Meperidine] Nausea And Vomiting    Pcn [Penicillins] Hives     Went to hospital for hives      Diet ADULT DIET; Regular   Isolation No active isolations     CURRENT SCHEDULED MEDICATIONS   Inpatient Medications     heparin (porcine), 5,000 Units, SubCUTAneous, BID    aspirin, 81 mg, Oral, Daily **OR** aspirin, 300 mg, Rectal, Daily    atorvastatin, 80 mg, Oral, Nightly   Infusions    dextrose        Antibiotics   Recent Abx Admin        No antibiotic orders with administrations found. LABS   Metabolic Panel Recent Labs     03/23/23  0505 03/23/23  1059 03/24/23  0627     --  143   K 4.4  --  4.0     --  109   CO2 17*  --  23   BUN 29*  --  19   CREATININE 2.3*  --  1.0   GLUCOSE 102*  --  95   CALCIUM 11.0*  --  9.3   LABALBU 5.3* 4.1 4.4   PHOS  --   --  2.8   ALKPHOS 76  --   --    ALT 15  --   --    AST 33  --   --       CBC / Coags Recent Labs     03/23/23  0505 03/24/23  0627   WBC 13.5* 5.9   RBC 4.47 3.78*   HGB 13.5 11.4*   HCT 40.3 34.0*    205      Other No results for input(s): LABA1C, LDLCALC, TRIG, TSH, [id number], FOLATE, LABSALI, COVID19 in the last 72 hours. Recent Labs     03/23/23  1059   LACTA 1.3        DIAGNOSTICS   IMAGES:  Images personally reviewed and agree w/ radiology interpretation. MRI Brain w/o Contrast:  Impression   1. No evidence of acute ischemia, intracranial hemorrhage, or mass. 2.  Mild chronic small vessel ischemic disease.        Previous imaging:  Head CT w/o

## 2023-03-25 LAB
EST. AVERAGE GLUCOSE BLD GHB EST-MCNC: 105.4 MG/DL
HBA1C MFR BLD: 5.3 %
TSH SERPL DL<=0.005 MIU/L-ACNC: 1.5 UIU/ML (ref 0.27–4.2)

## 2023-03-26 LAB — CREAT UR-MCNC: 165.6 MG/DL (ref 28–259)

## 2023-08-14 ENCOUNTER — OFFICE VISIT (OUTPATIENT)
Dept: ORTHOPEDIC SURGERY | Age: 78
End: 2023-08-14
Payer: MEDICARE

## 2023-08-14 VITALS — WEIGHT: 105 LBS | BODY MASS INDEX: 20.62 KG/M2 | HEIGHT: 60 IN | RESPIRATION RATE: 16 BRPM

## 2023-08-14 DIAGNOSIS — M79.602 PAIN OF LEFT UPPER EXTREMITY: Primary | ICD-10-CM

## 2023-08-14 PROCEDURE — 1123F ACP DISCUSS/DSCN MKR DOCD: CPT | Performed by: ORTHOPAEDIC SURGERY

## 2023-08-14 PROCEDURE — 99213 OFFICE O/P EST LOW 20 MIN: CPT | Performed by: ORTHOPAEDIC SURGERY

## 2023-08-14 NOTE — PATIENT INSTRUCTIONS
Thank you for choosing [address] [facility] for your Hand and Upper Extremity needs. If we can be of any further assistance to you, please do not hesitate to contact us.     Office Phone Number:  [phone]  or  [phone]

## 2023-08-14 NOTE — PROGRESS NOTES
Ms. Bessy Moser returns today in follow-up of her previously treated  bilateral Cubital Tunnel Syndrome. She was last seen be me in April, 2022 at which time she was treated with Surgical treatment in the form of staged Bilateral, Ulnar Nerve Decompression at the Cubital Tunnel. She experienced moderate relief of her initial symptoms. She  has noticed lateral arm symptom worsening over the last several months. She returns today with new onset symptoms of left lateral arm and posterior elbow pain, requesting further treatment. I have today reviewed with Bessy Moser the clinically relevant, past medical history, medications, allergies,  family history, social history, and Review Of Systems & I have documented any details relevant to today's presenting complaints in my history above. Ms. Edmund Morales's self-reported past medical history, medications, allergies,  family history, social history, and Review Of Systems have been scanned into the chart under the \"Media\" tab. Physical Exam:  Vitals  Respirations: 16  Height: 5' (152.4 cm)  Weight - Scale: 105 lb (47.6 kg)  Ms. Bessy Moser appears well, she is in no apparent distress, she demonstrates appropriate mood & affect. Skin: Normal Color, Normal Texture, and Free of Lesions Bilaterally   Digital range of motion is without significant limitation bilaterally  Wrist range of motion is without significant limitation bilaterally  Elbow range of motion is without significant limitation bilaterally  There is no evidence of gross joint instability bilaterally.   Sensation is subjectively normal in the Median Innervated Digits and Ulnar Innervated Digits on the Left, normal on the Right and objectively present in the same distribution bilaterally  Vascular examination reveals normal and good capillary refill bilaterally  Swelling is absent in the medial elbow, there is no tenderness at the medial epicondyle on the Left, normal on the

## 2023-08-21 ENCOUNTER — OFFICE VISIT (OUTPATIENT)
Dept: ORTHOPEDIC SURGERY | Age: 78
End: 2023-08-21

## 2023-08-21 VITALS — BODY MASS INDEX: 20.62 KG/M2 | RESPIRATION RATE: 16 BRPM | HEIGHT: 60 IN | WEIGHT: 105 LBS

## 2023-08-21 DIAGNOSIS — M75.42 SUBACROMIAL IMPINGEMENT OF LEFT SHOULDER: ICD-10-CM

## 2023-08-21 DIAGNOSIS — M25.512 ACUTE PAIN OF LEFT SHOULDER: Primary | ICD-10-CM

## 2023-08-21 RX ORDER — BUPIVACAINE HYDROCHLORIDE 2.5 MG/ML
4 INJECTION, SOLUTION INFILTRATION; PERINEURAL ONCE
Status: COMPLETED | OUTPATIENT
Start: 2023-08-21 | End: 2023-08-21

## 2023-08-21 RX ORDER — TRIAMCINOLONE ACETONIDE 40 MG/ML
40 INJECTION, SUSPENSION INTRA-ARTICULAR; INTRAMUSCULAR ONCE
Status: COMPLETED | OUTPATIENT
Start: 2023-08-21 | End: 2023-08-21

## 2023-08-21 RX ORDER — LIDOCAINE HYDROCHLORIDE 10 MG/ML
4 INJECTION, SOLUTION INFILTRATION; PERINEURAL ONCE
Status: COMPLETED | OUTPATIENT
Start: 2023-08-21 | End: 2023-08-21

## 2023-08-21 RX ADMIN — LIDOCAINE HYDROCHLORIDE 4 ML: 10 INJECTION, SOLUTION INFILTRATION; PERINEURAL at 10:28

## 2023-08-21 RX ADMIN — BUPIVACAINE HYDROCHLORIDE 10 MG: 2.5 INJECTION, SOLUTION INFILTRATION; PERINEURAL at 10:11

## 2023-08-21 RX ADMIN — TRIAMCINOLONE ACETONIDE 40 MG: 40 INJECTION, SUSPENSION INTRA-ARTICULAR; INTRAMUSCULAR at 10:28

## 2023-08-21 NOTE — PROGRESS NOTES
ORTHOPAEDIC CONSULTATION NOTE    Chief Complaint   Patient presents with    Shoulder Pain     Left shoulder          HPI  8/21/23  65 y.o. female RHD seen in consultation at the request of Dr Nicci Solorio MD for evaluation of left shoulder pain:  Patient reports onset of symptoms approximately 2 months ago  There is no injury or trauma  The pain is located in the left anterolateral shoulder and brachium  Intermittent radiation into the hand  No associated left hand numbness and tingling  She has history of left cubital tunnel surgery with Dr. Adwoa Givens  Pain is 1 out of 10 at rest  Constant dull achy pain  It is worse with overhead reaching activities  It does wake her up at night as well  Range of motion is painful  She typically takes Mobic, however with recent left shoulder issues, has been using Aleve as well  She is not a diabetic        Allergies   Allergen Reactions    Penicillins Rash     Pt states was hospitalized after taking PCN    Acetaminophen-Codeine Nausea And Vomiting     TYLENOL #3    Demerol Nausea And Vomiting    Demerol Hcl [Meperidine] Nausea And Vomiting    Pcn [Penicillins] Hives     Went to hospital for hives        Current Outpatient Medications   Medication Sig Dispense Refill    aspirin 81 MG EC tablet Take 1 tablet by mouth daily 30 tablet 3    atorvastatin (LIPITOR) 40 MG tablet Take 1 tablet by mouth nightly 30 tablet 0    omeprazole (PRILOSEC) 20 MG delayed release capsule Take 20 mg by mouth daily       Current Facility-Administered Medications   Medication Dose Route Frequency Provider Last Rate Last Admin    triamcinolone acetonide (KENALOG-40) injection 40 mg  40 mg Intra-artICUlar Once Parminder Singh MD        lidocaine 1 % injection 4 mL  4 mL Intra-artICUlar Once Parminder Singh MD        bupivacaine (MARCAINE) 0.25 % injection 10 mg  4 mL Intra-artICUlar Once Parminder Singh MD           Past Medical History:   Diagnosis Date    Arthritis     Hyperlipidemia     Primary osteoarthritis

## 2023-09-06 ENCOUNTER — HOSPITAL ENCOUNTER (OUTPATIENT)
Dept: PHYSICAL THERAPY | Age: 78
Setting detail: THERAPIES SERIES
Discharge: HOME | End: 2023-09-06
Payer: MEDICARE

## 2023-09-06 PROCEDURE — 97110 THERAPEUTIC EXERCISES: CPT

## 2023-09-06 PROCEDURE — 97530 THERAPEUTIC ACTIVITIES: CPT

## 2023-09-06 PROCEDURE — 97161 PT EVAL LOW COMPLEX 20 MIN: CPT

## 2023-09-06 NOTE — FLOWSHEET NOTE
[facility]  Phone: [phone]   Fax: [phone]    Physical Therapy Daily Treatment Note    Date:  2023     Patient Name:  Shala Kang    :  1945  MRN: [id number]  Medical Diagnosis:  Subacromial impingement of left shoulder [M75.42]  Treatment Diagnosis: sed L shoulder strength, Impaired trunk and shoulder postural awareness   Insurance/Certification information:  PT Insurance Information: [employer] Ad[employer] PPO. No copay or PA. Physician Information:  Carol Garrido MD    Plan of care signed (Y/N): []  Yes [x]  No     Date of Patient follow up with Physician:      Progress Report: []  Yes  [x]  No     Date Range for reporting period:  Beginnin2023  Ending:     Progress report due (10 Rx/or 30 days whichever is less): visit #8 or 16/3/45 (date)     Recertification due (POC duration/ or 90 days whichever is less): visit # or 23 (date)     Visit # Insurance Allowable Auth required?  Date Range    mn []  Yes  [x]  No pcy         Latex Allergy:  [x]NO      []YES  Preferred Language for Healthcare:   [x]English       []other:    Functional Scale:       Date assessed:  UEFI: raw score = 71; dysfunction = 11.25%                         Pain level:  0/10     SUBJECTIVE:  See eval    OBJECTIVE: See eval  Observation:   Test measurements:      RESTRICTIONS/PRECAUTIONS:     Exercises/Interventions:   Therapeutic Exercise (72389) Resistance / level Sets / Seconds  Reps Notes/Cues   HEP as below  10 mins     UBE       Wall slides  SB wall walks  W's  Y's                                                               Therapeutic Activities (94849)       Shelf reaches                                   Neuromuscular Re-ed (85729)       AURORA scap retract & protract   15                                Manual Intervention (26141)       Shld /GH Mobs gr II-III PA & inf  3 mins     Post Cap mobs       Thoracic/Rib manipulation tail bone

## 2023-09-13 ENCOUNTER — HOSPITAL ENCOUNTER (OUTPATIENT)
Dept: PHYSICAL THERAPY | Age: 78
Setting detail: THERAPIES SERIES
Discharge: HOME | End: 2023-09-13
Payer: MEDICARE

## 2023-09-13 PROCEDURE — 97140 MANUAL THERAPY 1/> REGIONS: CPT

## 2023-09-13 PROCEDURE — 97110 THERAPEUTIC EXERCISES: CPT

## 2023-09-13 NOTE — FLOWSHEET NOTE
house/yardwork, driving, computer work. Therapeutic Activities:    [x] (80940 or 79232) Provided verbal/tactile cueing for activities related to improving balance, coordination, kinesthetic sense, posture, motor skill, proprioception and motor activation to allow for proper function of scapular, scapulothoracic and UE control with self care, carrying, lifting, driving/computer work.      Home Exercise Program:    [x] (56764) Reviewed/Progressed HEP activities related to strengthening, flexibility, endurance, ROM of scapular, scapulothoracic and UE control with self care, reaching, carrying, lifting, house/yardwork, driving/computer work  [] (71402) Reviewed/Progressed HEP activities related to improving balance, coordination, kinesthetic sense, posture, motor skill, proprioception of scapular, scapulothoracic and UE control with self care, reaching, carrying, lifting, house/yardwork, driving/computer work      Manual Treatments:  PROM / STM / Oscillations-Mobs:  G-I, II, III, IV (PA's, Inf., Post.)  [x] (91144) Provided manual therapy to mobilize soft tissue/joints of cervical/CT, scapular GHJ and UE for the purpose of modulating pain, promoting relaxation,  increasing ROM, reducing/eliminating soft tissue swelling/inflammation/restriction, improving soft tissue extensibility and allowing for proper ROM for normal function with self care, reaching, carrying, lifting, house/yardwork, driving/computer work      Charges:  Timed Code Treatment Minutes: 43   Total Treatment Minutes: 43       [] EVAL - LOW (19279)   [] EVAL - MOD (36469)  [] EVAL - HIGH (12410)  [] RE-EVAL (79124)  [x] TB(37604) x 2      [] Ionto  [] NMR (36758) x       [] Vaso  [x] Manual (35426) x  1     [] Ultrasound  [] TA x        [] Mech Traction (39956)  [] Aquatic Therapy x     [] ES (un) (83065):   [] Home Management Training x  [] ES(attended) (58094)   [] Dry Needling 1-2 muscles (36856):  [] Dry Needling 3+ muscles (606733  [] Group:

## 2023-09-15 ENCOUNTER — HOSPITAL ENCOUNTER (OUTPATIENT)
Dept: PHYSICAL THERAPY | Age: 78
Setting detail: THERAPIES SERIES
Discharge: HOME | End: 2023-09-15
Payer: MEDICARE

## 2023-09-15 PROCEDURE — 97110 THERAPEUTIC EXERCISES: CPT

## 2023-09-15 PROCEDURE — 97140 MANUAL THERAPY 1/> REGIONS: CPT

## 2023-09-15 NOTE — FLOWSHEET NOTE
[facility]  Phone: [phone]   Fax: [phone]    Physical Therapy Daily Treatment Note    Date:  09/15/2023     Patient Name:  Thierry Pascual    :  1945  MRN: [id number]  Medical Diagnosis:  Subacromial impingement of left shoulder [M75.42]  Treatment Diagnosis: sed L shoulder strength, Impaired trunk and shoulder postural awareness   Insurance/Certification information:  PT Insurance Information: [employer] PPO. No copay or PA. Physician Information:  Zo Sesay MD    Plan of care signed (Y/N): [x]  Yes []  No     Date of Patient follow up with Physician:      Progress Report: []  Yes  [x]  No     Date Range for reporting period:  Beginnin2023  Ending:     Progress report due (10 Rx/or 30 days whichever is less): visit #8 or 05 (date)     Recertification due (POC duration/ or 90 days whichever is less): visit # or 23 (date)     Visit # Insurance Allowable Auth required? Date Range   3/8 mn []  Yes  [x]  No pcy         Latex Allergy:  [x]NO      []YES  Preferred Language for Healthcare:   [x]English       []other:    Functional Scale:       Date assessed:  UEFI: raw score = 71; dysfunction = 11.25%                         Pain level:  0/10     SUBJECTIVE:     has been doing well since last session, denies any pain since then and no new difficulties.         OBJECTIVE: See eval  Observation:   Test measurements:      RESTRICTIONS/PRECAUTIONS:     Exercises/Interventions:   Therapeutic Exercise (86676) Resistance / level Sets / Seconds  Reps Notes/Cues   HEP as below      UBE   fwd / bwd  3' / 2'     Wall slides with lift off  SB wall walks  W's  Y's   red  15 L  15  10    Resistance bands  -Mid row  -LPD  -high row  -GH ext  -GH ER   blue  blue   blue   Blue  lime   1  1  1  1  1   15  10  15  15  15 L, 30 R                                                     Therapeutic Activities (07390)       Shelf

## 2023-09-27 ENCOUNTER — HOSPITAL ENCOUNTER (OUTPATIENT)
Dept: PHYSICAL THERAPY | Age: 78
Setting detail: THERAPIES SERIES
Discharge: HOME | End: 2023-09-27
Payer: MEDICARE

## 2023-09-27 PROCEDURE — 97110 THERAPEUTIC EXERCISES: CPT

## 2023-09-27 PROCEDURE — 97140 MANUAL THERAPY 1/> REGIONS: CPT

## 2023-09-27 NOTE — FLOWSHEET NOTE
ROM for normal function with self care, reaching, carrying, lifting, house/yardwork, driving/computer work      Charges:  Timed Code Treatment Minutes: 44   Total Treatment Minutes: 44       [] EVAL - LOW (82713)   [] EVAL - MOD (02968)  [] EVAL - HIGH (44926)  [] RE-EVAL (71219)  [x] QQ(31487) x 2      [] Ionto  [] NMR (47126) x       [] Vaso  [x] Manual (41842) x  1     [] Ultrasound  [] TA x        [] Mech Traction (31166)  [] Aquatic Therapy x     [] ES (un) (38092):   [] Home Management Training x  [] ES(attended) (44569)   [] Dry Needling 1-2 muscles (52701):  [] Dry Needling 3+ muscles (826937  [] Group:      [] Other:                    GOALS:  Patient stated goal: return to all activity pain free  [] Progressing: [] Met: [] Not Met: [] Adjusted    Therapist goals for Patient:   Short Term Goals: To be achieved in: 2 weeks  1. Independent in HEP and progression per patient tolerance, in order to prevent re-injury. [] Progressing: [] Met: [] Not Met: [] Adjusted  2. Patient will have a decrease in pain to facilitate improvement in movement, function, and ADLs as indicated by Functional Deficits. [] Progressing: [] Met: [] Not Met: [] Adjusted    Long Term Goals: To be achieved in: 7 weeks  1. Pt will maintain or improve UEFI score of 71 throughout episode of care in order to return to PLOF. [] Progressing: [] Met: [] Not Met: [] Adjusted  2. Patient will demonstrate increased L shoulder flex & abduction AROM to at least 150 deg, pain-free, to continue to lift and reach into overhead kitchen cabinets. [] Progressing: [] Met: [] Not Met: [] Adjusted  3. Patient will demonstrate an increase in L shoulder strength to 4+/5 throughout, pain-free, to carry all groceries in to house and put away. [] Progressing: [] Met: [] Not Met: [] Adjusted  4. Patient will return to functional activities including sleeping >6 hours in any position without increased symptoms or restriction.    [] Progressing: [] Met: [] Not

## 2023-09-29 ENCOUNTER — HOSPITAL ENCOUNTER (OUTPATIENT)
Dept: PHYSICAL THERAPY | Age: 78
Setting detail: THERAPIES SERIES
Discharge: HOME | End: 2023-09-29
Payer: MEDICARE

## 2023-09-29 PROCEDURE — 97140 MANUAL THERAPY 1/> REGIONS: CPT

## 2023-09-29 PROCEDURE — 97110 THERAPEUTIC EXERCISES: CPT

## 2023-09-29 PROCEDURE — 97112 NEUROMUSCULAR REEDUCATION: CPT

## 2023-09-29 NOTE — FLOWSHEET NOTE
[facility]  Phone: [phone]   Fax: [phone]    Physical Therapy Daily Treatment Note    Date:  2023     Patient Name:  Karyn Santana    :  1945  MRN: [id number]  Medical Diagnosis:  Subacromial impingement of left shoulder [M75.42]  Treatment Diagnosis: sed L shoulder strength, Impaired trunk and shoulder postural awareness   Insurance/Certification information:  PT Insurance Information: [employer] PPO. No copay or PA. Physician Information:  Bret Dixon MD    Plan of care signed (Y/N): [x]  Yes []  No     Date of Patient follow up with Physician:      Progress Report: []  Yes  [x]  No     Date Range for reporting period:  Beginnin2023  Ending:     Progress report due (10 Rx/or 30 days whichever is less): visit #8 or  (date)     Recertification due (POC duration/ or 90 days whichever is less): visit # or 23 (date)     Visit # Insurance Allowable Auth required? Date Range    mn []  Yes  [x]  No pcy         Latex Allergy:  [x]NO      []YES  Preferred Language for Healthcare:   [x]English       []other:    Functional Scale:        Date assessed:  UEFI: raw score = 71; dysfunction = 11.25%                         Pain level:  0/10     SUBJECTIVE:   Pt reports she is feeling well. The cortisone was very helpful.        OBJECTIVE:    Observation:   Test measurements:      RESTRICTIONS/PRECAUTIONS:     Exercises/Interventions:   Therapeutic Exercise (82721) Resistance / level Sets / Seconds  Reps Notes/Cues   HEP as below      [place]  3\" 10    UBE   fwd / bwd  2' / 2'     Wall slides with lift off  SB wall walks  W's  Y's  B scap retract flat on wall   red  15  15  15    Resistance bands  -Mid row  -LPD  -high row  -GH ext  -GH ER  -GH IR   blue  blue   blue   Blue  Orange  Lime 2  2 10 L  10    FM cable cross  -high row  -LPD  -mid row   25#  25#  25#   1  1  1   15  15  15  neuro re-ed this

## 2023-10-04 ENCOUNTER — HOSPITAL ENCOUNTER (OUTPATIENT)
Dept: PHYSICAL THERAPY | Age: 78
Setting detail: THERAPIES SERIES
Discharge: HOME | End: 2023-10-04
Payer: MEDICARE

## 2023-10-04 PROCEDURE — 97110 THERAPEUTIC EXERCISES: CPT

## 2023-10-04 PROCEDURE — 97530 THERAPEUTIC ACTIVITIES: CPT

## 2023-10-04 NOTE — PROGRESS NOTES
[facility]   []  Not Ready for Return to Sports   Comments:      Plan for next treatment session: L shoulder joint mobility, pain relief, UE strengthening    PLAN: See sandie. PT 2x / week for 4 weeks. [x] Continue per plan of care [] Alter current plan (see comments)  [] Plan of care initiated [] Hold pending MD visit [] Discharge    Electronically signed by: Connie Fulton PT, DPT      Note: If patient does not return for scheduled/ recommended follow up visits, this note will serve as a discharge from care along with most recent update on progress.

## 2023-10-06 ENCOUNTER — APPOINTMENT (OUTPATIENT)
Dept: PHYSICAL THERAPY | Age: 78
End: 2023-10-06
Payer: MEDICARE

## 2023-10-09 ENCOUNTER — HOSPITAL ENCOUNTER (OUTPATIENT)
Dept: PHYSICAL THERAPY | Age: 78
Setting detail: THERAPIES SERIES
Discharge: HOME | End: 2023-10-09
Payer: MEDICARE

## 2023-10-09 PROCEDURE — 97140 MANUAL THERAPY 1/> REGIONS: CPT

## 2023-10-09 PROCEDURE — 97110 THERAPEUTIC EXERCISES: CPT

## 2023-10-09 NOTE — PROGRESS NOTES
flexion [] Not Met: [] Adjusted  3. Patient will demonstrate an increase in L shoulder strength to 4+/5 throughout, pain-free, to carry all groceries in to house and put away. [x] Progressing: [] Met: [] Not Met: [] Adjusted  4. Patient will return to functional activities including sleeping >6 hours in any position without increased symptoms or restriction. [] Progressing: [x] Met: [] Not Met: [] Adjusted    Overall Progression Towards Functional goals/ Treatment Progress Update:  [x] Patient is progressing as expected towards functional goals listed. [] Progression is slowed due to complexities/Impairments listed. [] Progression has been slowed due to co-morbidities. [] Plan just implemented, too soon to assess goals progression <30days   [] Goals require adjustment due to lack of progress  [] Patient is not progressing as expected and requires additional follow up with physician  [] Other    Persisting Functional Limitations/Impairments:  []Sitting []Standing   []Transfers  [x]Sleeping   [x]Reaching [x]Lifting   []ADLs [x]Housework  []Driving []Job related tasks  []Sports/Recreation []Other:    ASSESSMENT:   Patient continues to progress well, [place] and denies anything that caused her pain. Pt has 1 session remaining before trip out of town, expect discharge at next visit.        Treatment/Activity Tolerance:  [x] Pt able to complete treatment [] Patient limited by fatique  [] Patient limited by pain  [] Patient limited by other medical complications  [] Other:     Prognosis:  [x] Good [] Fair  [] Poor    Patient Requires Follow-up: [x] Yes  [] No    Return to Play:    [x]  N/A     []  Stage 1: Intro to Strength   []  Stage 2: Dynamic Strength and Intro to Plyometrics   []  Stage 3: Advanced Plyometrics and Intro to Throwing   []  Stage 4: Sport specific Training/Return to Sport     []  Ready to Return to Play, [employer] [facility] [employer]   []  Not Ready for Return to Sports   Comments:

## 2023-10-11 ENCOUNTER — HOSPITAL ENCOUNTER (OUTPATIENT)
Dept: PHYSICAL THERAPY | Age: 78
Setting detail: THERAPIES SERIES
Discharge: HOME | End: 2023-10-11
Payer: MEDICARE

## 2023-10-11 PROCEDURE — 97140 MANUAL THERAPY 1/> REGIONS: CPT

## 2023-10-11 PROCEDURE — 97110 THERAPEUTIC EXERCISES: CPT

## 2023-10-11 PROCEDURE — 97530 THERAPEUTIC ACTIVITIES: CPT

## 2023-10-26 ENCOUNTER — OFFICE VISIT (OUTPATIENT)
Dept: ORTHOPEDIC SURGERY | Age: 78
End: 2023-10-26

## 2023-10-26 VITALS — BODY MASS INDEX: 20.62 KG/M2 | WEIGHT: 105 LBS | HEIGHT: 60 IN

## 2023-10-26 DIAGNOSIS — M75.42 SUBACROMIAL IMPINGEMENT OF LEFT SHOULDER: Primary | ICD-10-CM

## 2023-10-26 NOTE — PROGRESS NOTES
ORTHOPAEDIC CONSULTATION NOTE    Chief Complaint   Patient presents with    Follow-up     Fu left shoulder       HPI  10/26/23  FU left shoulder  Doing extremely well  States that the steroid injection given at her last visit resolved her pain completely after a few days  She then went through a full course of physical therapy with no pain and was able to regain all her ROM and function  Able to perform all activities with no issues  No new complaints      8/21/23  65 y.o. female RHD seen in consultation at the request of Dr Graylon Duane MD for evaluation of left shoulder pain:  Patient reports onset of symptoms approximately 2 months ago  There is no injury or trauma  The pain is located in the left anterolateral shoulder and brachium  Intermittent radiation into the hand  No associated left hand numbness and tingling  She has history of left cubital tunnel surgery with Dr. Kamala Mott  Pain is 1 out of 10 at rest  Constant dull achy pain  It is worse with overhead reaching activities  It does wake her up at night as well  Range of motion is painful  She typically takes Mobic, however with recent left shoulder issues, has been using Aleve as well  She is not a diabetic        Allergies   Allergen Reactions    Penicillins Rash     Pt states was hospitalized after taking PCN    Acetaminophen-Codeine Nausea And Vomiting     TYLENOL #3    Demerol Nausea And Vomiting    Demerol Hcl [Meperidine] Nausea And Vomiting    Pcn [Penicillins] Hives     Went to hospital for hives        Current Outpatient Medications   Medication Sig Dispense Refill    aspirin 81 MG EC tablet Take 1 tablet by mouth daily 30 tablet 3    atorvastatin (LIPITOR) 40 MG tablet Take 1 tablet by mouth nightly 30 tablet 0    omeprazole (PRILOSEC) 20 MG delayed release capsule Take 1 capsule by mouth daily       No current facility-administered medications for this visit.        Past Medical History:   Diagnosis Date    Arthritis     Hyperlipidemia     Primary

## (undated) DEVICE — INTENDED FOR TISSUE SEPARATION, AND OTHER PROCEDURES THAT REQUIRE A SHARP SURGICAL BLADE TO PUNCTURE OR CUT.: Brand: BARD-PARKER ® STAINLESS STEEL BLADES

## (undated) DEVICE — WRAP COHESIVE W2INXL5YD TAN SELF ADH BNDG HND NON STERILE TEAR CARING

## (undated) DEVICE — PADDING UNDERCAST W4INXL4YD 100% COT CRIMPED FINISH WBRL II

## (undated) DEVICE — SOLUTION IRRIG 250ML 0.9% SOD CHL USP POUR PLAS BTL

## (undated) DEVICE — BANDAGE COMPR W2INXL5YD TAN BRTH SELF ADH WRP W/ HND TEAR

## (undated) DEVICE — GOWN SIRUS NONREIN XL W/TWL: Brand: MEDLINE INDUSTRIES, INC.

## (undated) DEVICE — GLOVE SURG SZ 65 CRM LTX FREE POLYISOPRENE POLYMER BEAD ANTI

## (undated) DEVICE — GLOVE SURG SZ 75 CRM LTX FREE POLYISOPRENE POLYMER BEAD ANTI

## (undated) DEVICE — WILLIS PACK: Brand: MEDLINE INDUSTRIES, INC.

## (undated) DEVICE — BANDAGE COMPR W4INXL15FT BGE E SGL LAYERED CLP CLSR

## (undated) DEVICE — GLOVE SURG SZ 65 L12IN FNGR THK79MIL GRN LTX FREE

## (undated) DEVICE — SUTURE VCRL + SZ 3-0 L27IN ABSRB UD L26MM SH 1/2 CIR VCP416H

## (undated) DEVICE — Z DISCONTINUED USE 2272117 DRAPE SURG 3 QTR N INVASIVE 2 LAYR DISP